# Patient Record
Sex: FEMALE | Employment: PART TIME | URBAN - METROPOLITAN AREA
[De-identification: names, ages, dates, MRNs, and addresses within clinical notes are randomized per-mention and may not be internally consistent; named-entity substitution may affect disease eponyms.]

---

## 2022-11-07 ENCOUNTER — HOSPITAL ENCOUNTER (OUTPATIENT)
Dept: MAMMOGRAPHY | Facility: HOSPITAL | Age: 42
Discharge: HOME | End: 2022-11-07
Attending: SPECIALIST
Payer: COMMERCIAL

## 2022-11-07 DIAGNOSIS — Z12.31 SCREENING MAMMOGRAM, ENCOUNTER FOR: ICD-10-CM

## 2022-11-07 PROCEDURE — 77067 SCR MAMMO BI INCL CAD: CPT | Performed by: SPECIALIST

## 2022-11-07 PROCEDURE — 77063 BREAST TOMOSYNTHESIS BI: CPT | Performed by: SPECIALIST

## 2023-04-10 ENCOUNTER — OFFICE VISIT (OUTPATIENT)
Dept: INTERNAL MEDICINE CLINIC | Facility: CLINIC | Age: 43
End: 2023-04-10
Payer: COMMERCIAL

## 2023-04-10 VITALS
WEIGHT: 183.38 LBS | OXYGEN SATURATION: 96 % | DIASTOLIC BLOOD PRESSURE: 76 MMHG | TEMPERATURE: 98 F | BODY MASS INDEX: 28.45 KG/M2 | HEART RATE: 69 BPM | SYSTOLIC BLOOD PRESSURE: 120 MMHG | RESPIRATION RATE: 14 BRPM | HEIGHT: 67.44 IN

## 2023-04-10 DIAGNOSIS — Z86.2 HISTORY OF IRON DEFICIENCY ANEMIA: ICD-10-CM

## 2023-04-10 DIAGNOSIS — Z13.1 SCREENING FOR DIABETES MELLITUS: ICD-10-CM

## 2023-04-10 DIAGNOSIS — Z00.00 ENCOUNTER FOR ANNUAL PHYSICAL EXAM: Primary | ICD-10-CM

## 2023-04-10 DIAGNOSIS — E27.8 ADRENAL CYST (HCC): ICD-10-CM

## 2023-04-10 DIAGNOSIS — K58.1 IRRITABLE BOWEL SYNDROME WITH CONSTIPATION: ICD-10-CM

## 2023-04-10 DIAGNOSIS — E03.9 ACQUIRED HYPOTHYROIDISM: ICD-10-CM

## 2023-04-10 DIAGNOSIS — Z13.220 SCREENING FOR LIPID DISORDERS: ICD-10-CM

## 2023-04-10 PROBLEM — K58.9 IBS (IRRITABLE BOWEL SYNDROME): Status: ACTIVE | Noted: 2023-04-10

## 2023-04-10 PROCEDURE — 3078F DIAST BP <80 MM HG: CPT | Performed by: NURSE PRACTITIONER

## 2023-04-10 PROCEDURE — 3008F BODY MASS INDEX DOCD: CPT | Performed by: NURSE PRACTITIONER

## 2023-04-10 PROCEDURE — 3074F SYST BP LT 130 MM HG: CPT | Performed by: NURSE PRACTITIONER

## 2023-04-10 PROCEDURE — 99386 PREV VISIT NEW AGE 40-64: CPT | Performed by: NURSE PRACTITIONER

## 2023-04-10 RX ORDER — LEVOTHYROXINE SODIUM 88 UG/1
88 TABLET ORAL DAILY
COMMUNITY
Start: 2023-02-20

## 2023-04-10 RX ORDER — MELATONIN
1000 DAILY
COMMUNITY

## 2023-04-10 NOTE — PATIENT INSTRUCTIONS
Get your labs done. You should be fasting for at least 10 hours. If you take a multivitamin with Biotin or any biotin product it should be held for 3 days prior to getting your labs done. If you use [facility] labs, you may schedule at [phone] or on-line at [facility].     See GI. See gyne as planned. Get your covid booster at the local pharmacy.

## 2023-04-17 ENCOUNTER — LAB ENCOUNTER (OUTPATIENT)
Dept: LAB | Age: 43
End: 2023-04-17
Attending: NURSE PRACTITIONER
Payer: COMMERCIAL

## 2023-04-17 LAB
ALBUMIN SERPL-MCNC: 3.7 G/DL (ref 3.4–5)
ALBUMIN/GLOB SERPL: 1.1 {RATIO} (ref 1–2)
ALP LIVER SERPL-CCNC: 42 U/L
ALT SERPL-CCNC: 26 U/L
ANION GAP SERPL CALC-SCNC: 0 MMOL/L (ref 0–18)
AST SERPL-CCNC: 19 U/L (ref 15–37)
BASOPHILS # BLD AUTO: 0.04 X10(3) UL (ref 0–0.2)
BASOPHILS NFR BLD AUTO: 0.6 %
BILIRUB SERPL-MCNC: 0.6 MG/DL (ref 0.1–2)
BUN BLD-MCNC: 11 MG/DL (ref 7–18)
CALCIUM BLD-MCNC: 8.9 MG/DL (ref 8.5–10.1)
CHLORIDE SERPL-SCNC: 108 MMOL/L (ref 98–112)
CHOLEST SERPL-MCNC: 174 MG/DL (ref ?–200)
CO2 SERPL-SCNC: 26 MMOL/L (ref 21–32)
CREAT BLD-MCNC: 0.94 MG/DL
DEPRECATED HBV CORE AB SER IA-ACNC: 11.3 NG/ML
EOSINOPHIL # BLD AUTO: 0.31 X10(3) UL (ref 0–0.7)
EOSINOPHIL NFR BLD AUTO: 4.6 %
ERYTHROCYTE [DISTWIDTH] IN BLOOD BY AUTOMATED COUNT: 11.9 %
EST. AVERAGE GLUCOSE BLD GHB EST-MCNC: 103 MG/DL (ref 68–126)
FASTING PATIENT LIPID ANSWER: YES
FASTING STATUS PATIENT QL REPORTED: YES
GFR SERPLBLD BASED ON 1.73 SQ M-ARVRAT: 77 ML/MIN/1.73M2 (ref 60–?)
GLOBULIN PLAS-MCNC: 3.3 G/DL (ref 2.8–4.4)
GLUCOSE BLD-MCNC: 101 MG/DL (ref 70–99)
HBA1C MFR BLD: 5.2 % (ref ?–5.7)
HCT VFR BLD AUTO: 42.4 %
HDLC SERPL-MCNC: 85 MG/DL (ref 40–59)
HGB BLD-MCNC: 14.1 G/DL
IMM GRANULOCYTES # BLD AUTO: 0.02 X10(3) UL (ref 0–1)
IMM GRANULOCYTES NFR BLD: 0.3 %
IRON SATN MFR SERPL: 33 %
IRON SERPL-MCNC: 158 UG/DL
LDLC SERPL CALC-MCNC: 71 MG/DL (ref ?–100)
LYMPHOCYTES # BLD AUTO: 2.36 X10(3) UL (ref 1–4)
LYMPHOCYTES NFR BLD AUTO: 35.3 %
MCH RBC QN AUTO: 32.1 PG (ref 26–34)
MCHC RBC AUTO-ENTMCNC: 33.3 G/DL (ref 31–37)
MCV RBC AUTO: 96.6 FL
MONOCYTES # BLD AUTO: 0.67 X10(3) UL (ref 0.1–1)
MONOCYTES NFR BLD AUTO: 10 %
NEUTROPHILS # BLD AUTO: 3.29 X10 (3) UL (ref 1.5–7.7)
NEUTROPHILS # BLD AUTO: 3.29 X10(3) UL (ref 1.5–7.7)
NEUTROPHILS NFR BLD AUTO: 49.2 %
NONHDLC SERPL-MCNC: 89 MG/DL (ref ?–130)
OSMOLALITY SERPL CALC.SUM OF ELEC: 278 MOSM/KG (ref 275–295)
PLATELET # BLD AUTO: 239 10(3)UL (ref 150–450)
POTASSIUM SERPL-SCNC: 4.3 MMOL/L (ref 3.5–5.1)
PROT SERPL-MCNC: 7 G/DL (ref 6.4–8.2)
RBC # BLD AUTO: 4.39 X10(6)UL
SODIUM SERPL-SCNC: 134 MMOL/L (ref 136–145)
TIBC SERPL-MCNC: 483 UG/DL (ref 240–450)
TRANSFERRIN SERPL-MCNC: 324 MG/DL (ref 200–360)
TRIGL SERPL-MCNC: 105 MG/DL (ref 30–149)
TSI SER-ACNC: 0.7 MIU/ML (ref 0.36–3.74)
VLDLC SERPL CALC-MCNC: 16 MG/DL (ref 0–30)
WBC # BLD AUTO: 6.7 X10(3) UL (ref 4–11)

## 2023-04-17 PROCEDURE — 83550 IRON BINDING TEST: CPT | Performed by: NURSE PRACTITIONER

## 2023-04-17 PROCEDURE — 83540 ASSAY OF IRON: CPT | Performed by: NURSE PRACTITIONER

## 2023-04-17 PROCEDURE — 83036 HEMOGLOBIN GLYCOSYLATED A1C: CPT | Performed by: NURSE PRACTITIONER

## 2023-04-17 PROCEDURE — 80061 LIPID PANEL: CPT | Performed by: NURSE PRACTITIONER

## 2023-04-17 PROCEDURE — 82728 ASSAY OF FERRITIN: CPT | Performed by: NURSE PRACTITIONER

## 2023-04-17 PROCEDURE — 80050 GENERAL HEALTH PANEL: CPT | Performed by: NURSE PRACTITIONER

## 2023-05-08 ENCOUNTER — OFFICE VISIT (OUTPATIENT)
Facility: CLINIC | Age: 43
End: 2023-05-08
Payer: COMMERCIAL

## 2023-05-08 VITALS
BODY MASS INDEX: 28.91 KG/M2 | WEIGHT: 184.19 LBS | DIASTOLIC BLOOD PRESSURE: 76 MMHG | SYSTOLIC BLOOD PRESSURE: 116 MMHG | HEART RATE: 69 BPM | HEIGHT: 67 IN

## 2023-05-08 DIAGNOSIS — E66.3 OVERWEIGHT (BMI 25.0-29.9): ICD-10-CM

## 2023-05-08 DIAGNOSIS — R19.8 IRREGULAR BOWEL HABITS: ICD-10-CM

## 2023-05-08 DIAGNOSIS — Z01.411 ENCOUNTER FOR WELL WOMAN EXAM WITH ABNORMAL FINDINGS: Primary | ICD-10-CM

## 2023-05-08 DIAGNOSIS — N94.6 DYSMENORRHEA: ICD-10-CM

## 2023-05-08 DIAGNOSIS — D21.9 FIBROID: ICD-10-CM

## 2023-05-08 DIAGNOSIS — R45.86 MOOD DISTURBANCE: ICD-10-CM

## 2023-05-08 DIAGNOSIS — R73.01 ELEVATED FASTING GLUCOSE: ICD-10-CM

## 2023-05-08 DIAGNOSIS — R92.2 DENSE BREASTS: ICD-10-CM

## 2023-05-08 DIAGNOSIS — Z12.39 SCREENING FOR BREAST CANCER USING NON-MAMMOGRAM MODALITY: ICD-10-CM

## 2023-05-08 DIAGNOSIS — Z30.09 GENERAL COUNSELING AND ADVICE FOR CONTRACEPTIVE MANAGEMENT: ICD-10-CM

## 2023-05-08 DIAGNOSIS — N94.10 DYSPAREUNIA, FEMALE: ICD-10-CM

## 2023-05-08 DIAGNOSIS — Z12.4 SCREENING FOR CERVICAL CANCER: ICD-10-CM

## 2023-05-08 DIAGNOSIS — Z12.31 ENCOUNTER FOR SCREENING MAMMOGRAM FOR MALIGNANT NEOPLASM OF BREAST: ICD-10-CM

## 2023-05-08 DIAGNOSIS — N92.0 MENORRHAGIA WITH REGULAR CYCLE: ICD-10-CM

## 2023-05-08 PROCEDURE — 3008F BODY MASS INDEX DOCD: CPT | Performed by: OBSTETRICS & GYNECOLOGY

## 2023-05-08 PROCEDURE — 87624 HPV HI-RISK TYP POOLED RSLT: CPT | Performed by: OBSTETRICS & GYNECOLOGY

## 2023-05-08 PROCEDURE — 99204 OFFICE O/P NEW MOD 45 MIN: CPT | Performed by: OBSTETRICS & GYNECOLOGY

## 2023-05-08 PROCEDURE — 99386 PREV VISIT NEW AGE 40-64: CPT | Performed by: OBSTETRICS & GYNECOLOGY

## 2023-05-08 PROCEDURE — 3078F DIAST BP <80 MM HG: CPT | Performed by: OBSTETRICS & GYNECOLOGY

## 2023-05-08 PROCEDURE — 3074F SYST BP LT 130 MM HG: CPT | Performed by: OBSTETRICS & GYNECOLOGY

## 2023-05-08 NOTE — PATIENT INSTRUCTIONS
Pelvic ultrasound best done cycle day 5-9 (tail end of period)    THE [facility] Pelvic Floor Physical Therapy    [address], [address] [facility] [place] [address], [address] [facility]. Ph: [phone]  [address], [facility], 2nd floor. [facility]. Ph: [phone] & [phone]  [address], [place], [address] [facility]. Ph [phone]  [address], [place], [place] [address]. Ph [phone]  [address], [facility]. Ph [phone]  [address] [place] [address]. Ph [phone]     The Role of Physical Therapy in the Treatment of Pelvic Floor Dysfunction:    Physical therapists are trained to evaluate and treat dysfunctions in the joints, muscles, nerves and scar. Physical therapists specifically trained in the area of pelvic health can identify the possible musculoskeletal causes of pelvic pain, bladder and bowel difficulties and develop a treatment plan specific to the individual suffering from this difficulties. What to expect at your first physical therapy appointment:   Your first visit will include an initial evaluation in a comfortable, private room by a therapist who has undergone advanced education and training in the evaluation and treatment of pelvic muscle dysfunction. The therapist will obtain a detailed history of your health, pain and activity limitations. She will also ask you about any bowel, bladder and sexual difficulties as these are in part controlled by the pelvic muscles. The therapist will then take a look at your posture, mobility of your spine and hips and strength and flexibility of pelvic girdle muscles. She will examine any scar tissue and trigger points in the muscles of your pelvic region. The therapist will also specifically examine the pelvic floor muscles. Your pelvic floor consists of a group of muscles that attach behind the pubic bone in the front to the tail bone in the back.  They are responsible for providing support to the pelvic joints and organs, relaxing to allow the passage of urine, stool and gas and cayden to prevent the loss of urine, stool and gas as appropriate. In order to best examine these muscles you will be asked to undress from the waist down and be covered with a sheet. The therapist will use a lubricated, gloved finger to identify painful muscles around and in your vagina or rectum then instruct you to contract and relax these muscles in order to determine how the muscles are functioning. Care is taken to make you as comfortable as possible with the exam.     Your therapist will discuss the evaluation results with you and provide you with education regarding your specific condition and the expectation of therapy. She will answer all of your questions and will work with you to establish a treatment plan based on the results of the evaluation and your goals for therapy. Breast Density:  Because of breast density - may benefit from supplemental screening with Molecular Breast Imaging (MBI) or Bilateral Whole Breast Screening Ultrasound for increased sensitivity for detection of cancer which can be obscured mammographically. Insurance does not always cover these additional methods of screening. If you are interested in having either of these done, we ask that you contact your insurance to verify coverage and let us know if you want  either of them ordered.      The codes to provide your insurance with are as follows:      Bilateral Whole Breast Ultrasound  Procedure code: 219008-75  Diagnosis codes: Dense breast (R92.2), Screening for breast cancer other than mammogram (Z12.39)     Molecular Breast Imaging  Procedure code: 18796  Diagnosis codes: Dense breast (R92.2), Screening for breast cancer other than mammogram (Z12.39)

## 2023-05-09 LAB — HPV I/H RISK 1 DNA SPEC QL NAA+PROBE: NEGATIVE

## 2023-05-22 ENCOUNTER — HOSPITAL ENCOUNTER (OUTPATIENT)
Dept: ULTRASOUND IMAGING | Age: 43
Discharge: HOME | End: 2023-05-22
Attending: OBSTETRICS & GYNECOLOGY
Payer: COMMERCIAL

## 2023-05-22 DIAGNOSIS — D21.9 FIBROID: ICD-10-CM

## 2023-05-22 DIAGNOSIS — N94.10 DYSPAREUNIA, FEMALE: ICD-10-CM

## 2023-05-22 DIAGNOSIS — N92.0 MENORRHAGIA WITH REGULAR CYCLE: ICD-10-CM

## 2023-05-22 PROCEDURE — 76830 TRANSVAGINAL US NON-OB: CPT | Performed by: OBSTETRICS & GYNECOLOGY

## 2023-05-22 PROCEDURE — 76856 US EXAM PELVIC COMPLETE: CPT | Performed by: OBSTETRICS & GYNECOLOGY

## 2023-05-23 ENCOUNTER — TELEPHONE (OUTPATIENT)
Facility: CLINIC | Age: 43
End: 2023-05-23

## 2023-05-23 NOTE — PROGRESS NOTES
Pt aware of results & recommendations for an EMB to start. Scheduled 7/17/23 at 2:00. Breifly discussed other options if EMB is negative: hormonal contraceptives, GnRH antagonist, myomectomy, hysterectomy, expectant management. Will discuss further at upcoming appointment. Verbalized understanding.

## 2023-06-12 ENCOUNTER — TELEPHONE (OUTPATIENT)
Dept: ENDOCRINOLOGY CLINIC | Facility: CLINIC | Age: 43
End: 2023-06-12

## 2023-06-12 ENCOUNTER — LAB ENCOUNTER (OUTPATIENT)
Dept: LAB | Age: 43
End: 2023-06-12
Attending: NURSE PRACTITIONER
Payer: COMMERCIAL

## 2023-06-12 DIAGNOSIS — E87.1 HYPONATREMIA: ICD-10-CM

## 2023-06-12 DIAGNOSIS — R14.0 BLOATING: ICD-10-CM

## 2023-06-12 LAB
IGA SERPL-MCNC: 212 MG/DL (ref 70–312)
SODIUM SERPL-SCNC: 136 MMOL/L (ref 136–145)

## 2023-06-12 PROCEDURE — 84295 ASSAY OF SERUM SODIUM: CPT

## 2023-06-12 PROCEDURE — 82784 ASSAY IGA/IGD/IGG/IGM EACH: CPT

## 2023-06-12 PROCEDURE — 86364 TISS TRNSGLTMNASE EA IG CLAS: CPT

## 2023-06-12 PROCEDURE — 36415 COLL VENOUS BLD VENIPUNCTURE: CPT

## 2023-06-12 NOTE — TELEPHONE ENCOUNTER
Patient was scheduled for a consult with Dr. Som goldman for noris-menopause symptoms. Has new insurance that does not verify as active and declined self-pay. Rescheduled for 10/09 with a waitlist. Patient requesting sooner appointment. Please follow-up with patient.

## 2023-06-13 LAB — TTG IGA SER-ACNC: 0.4 U/ML (ref ?–7)

## 2023-06-26 NOTE — TELEPHONE ENCOUNTER
Can get on wait list please   Can also offer apt with another MD if available sooner  Can not add on at this time, since we are very booked  Thanks

## 2023-07-17 ENCOUNTER — OFFICE VISIT (OUTPATIENT)
Facility: CLINIC | Age: 43
End: 2023-07-17
Payer: COMMERCIAL

## 2023-07-17 VITALS
HEIGHT: 63 IN | BODY MASS INDEX: 32.43 KG/M2 | WEIGHT: 183 LBS | SYSTOLIC BLOOD PRESSURE: 120 MMHG | HEART RATE: 78 BPM | DIASTOLIC BLOOD PRESSURE: 78 MMHG

## 2023-07-17 DIAGNOSIS — Z01.812 PRE-PROCEDURAL LABORATORY EXAMINATION: ICD-10-CM

## 2023-07-17 DIAGNOSIS — Z71.2 ENCOUNTER TO DISCUSS TEST RESULTS: Primary | ICD-10-CM

## 2023-07-17 DIAGNOSIS — D21.9 FIBROID: ICD-10-CM

## 2023-07-17 DIAGNOSIS — N94.6 DYSMENORRHEA: ICD-10-CM

## 2023-07-17 DIAGNOSIS — N94.10 DYSPAREUNIA, FEMALE: ICD-10-CM

## 2023-07-17 DIAGNOSIS — N92.0 MENORRHAGIA WITH REGULAR CYCLE: ICD-10-CM

## 2023-07-17 LAB
CONTROL LINE PRESENT WITH A CLEAR BACKGROUND (YES/NO): YES YES/NO
KIT LOT #: NORMAL NUMERIC
PREGNANCY TEST, URINE: NEGATIVE

## 2023-07-17 PROCEDURE — 88305 TISSUE EXAM BY PATHOLOGIST: CPT | Performed by: OBSTETRICS & GYNECOLOGY

## 2023-07-17 NOTE — PATIENT INSTRUCTIONS
Central Scheduling Phone: [phone]  Suresh Merritt Pelvic Floor Physical Therapy  [address], [address] [facility] [place], [address] Ph: [phone]  [address] [facility], [facility], 2nd floor. [facility]. Ph: [phone] & [phone]  [address], [place], [address] [facility]. Ph [phone]  [address], [facility] 72. Ph [phone]  [address] [place] [address], [facility]. Ph [phone] [address] [place] [address].  Ph [phone]

## 2023-07-17 NOTE — PROCEDURES
2023    Procedure: Endometrial biopsy    Pre-op Dx: Menorrhagia, Dysmenorrhea, Fibroid    Post-op Dx: Same    Indication: 39year old  female with history of 7 cm fibroid since at least . Worsening menorrhagia, dysmenorrhea, longstanding dyspareunia, occasional intermenstrual cramping, irregular bowel habits with blood in stool & dyschezia, known fibroid uterus. Pelvic US 23 with 2.5 mm endometrium, fibroid 7.1 cm intramural posterior fundus, some of which appeared degenerate & did appear to distort the endometrial cavity somewhat. Ovaries normal. There was also a small isthmocele noted on my read of images as well. Recommend endometrial biopsy to start. Then treatment options will depend on pathology. Risks, benefits, alternatives discussed. Pelvic floor moderate to severe hypertonicity  Speculum placed. Cervix very anterior  Difficult to reach anterior lip of cervix with speculum  Cervix swabbed with betadine x 3   Tenaculum applied to posterior lip of cervix  Cervical canal not stenotic  Pipelle inserted without difficulty to 9 cm  Multiple passes made with moderate tissue obtained. Pipelle & tenaculum removed  Hemostatic tenaculum sites  Speculum removed.    Tolerated well  EBL minimal  Specimen: endometrial biopsy    Pap & HPV neg 23    Radha Lopez MD

## 2023-07-21 PROBLEM — N84.0 ENDOMETRIAL POLYP: Status: ACTIVE | Noted: 2023-07-17

## 2023-07-24 ENCOUNTER — TELEPHONE (OUTPATIENT)
Facility: CLINIC | Age: 43
End: 2023-07-24

## 2023-08-09 ENCOUNTER — TELEPHONE (OUTPATIENT)
Facility: CLINIC | Age: 43
End: 2023-08-09

## 2023-08-10 NOTE — TELEPHONE ENCOUNTER
Poke with pt. Discussed options for surgery. Pt does not want an IUD and will hold off on the ablation. Will schedule a hysteroscopy D&C Polypectomy. Pt aware Glendon Goltz will call once scheduled. Prefers Monday's if possible. Verbalized understanding.

## 2023-08-11 ENCOUNTER — TELEPHONE (OUTPATIENT)
Facility: CLINIC | Age: 43
End: 2023-08-11

## 2023-08-11 DIAGNOSIS — N84.0 ENDOMETRIAL POLYP: ICD-10-CM

## 2023-08-11 DIAGNOSIS — N92.0 MENORRHAGIA WITH REGULAR CYCLE: Primary | ICD-10-CM

## 2023-08-11 DIAGNOSIS — N94.6 DYSMENORRHEA: ICD-10-CM

## 2023-09-05 PROBLEM — K21.9 GASTROESOPHAGEAL REFLUX DISEASE: Status: ACTIVE | Noted: 2023-09-05

## 2023-09-05 PROBLEM — R14.1 ABDOMINAL GAS PAIN: Status: ACTIVE | Noted: 2023-09-05

## 2023-09-05 PROBLEM — R19.7 DIARRHEA, UNSPECIFIED: Status: ACTIVE | Noted: 2023-09-05

## 2023-09-05 PROBLEM — D50.9 ANEMIA, IRON DEFICIENCY: Status: ACTIVE | Noted: 2023-09-05

## 2023-09-18 ENCOUNTER — LABORATORY ENCOUNTER (OUTPATIENT)
Dept: LAB | Age: 43
End: 2023-09-18
Attending: OBSTETRICS & GYNECOLOGY
Payer: COMMERCIAL

## 2023-09-18 DIAGNOSIS — Z01.818 PRE-OP TESTING: ICD-10-CM

## 2023-09-18 LAB
ERYTHROCYTE [DISTWIDTH] IN BLOOD BY AUTOMATED COUNT: 11.7 %
HCT VFR BLD AUTO: 43.1 %
HGB BLD-MCNC: 14 G/DL
MCH RBC QN AUTO: 31.7 PG (ref 26–34)
MCHC RBC AUTO-ENTMCNC: 32.5 G/DL (ref 31–37)
MCV RBC AUTO: 97.5 FL
PLATELET # BLD AUTO: 243 10(3)UL (ref 150–450)
RBC # BLD AUTO: 4.42 X10(6)UL
WBC # BLD AUTO: 7.5 X10(3) UL (ref 4–11)

## 2023-09-18 PROCEDURE — 85027 COMPLETE CBC AUTOMATED: CPT

## 2023-09-18 PROCEDURE — 36415 COLL VENOUS BLD VENIPUNCTURE: CPT

## 2023-09-21 ENCOUNTER — HOSPITAL ENCOUNTER (OUTPATIENT)
Facility: HOSPITAL | Age: 43
Setting detail: HOSPITAL OUTPATIENT SURGERY
Discharge: HOME | End: 2023-09-21
Attending: OBSTETRICS & GYNECOLOGY | Admitting: OBSTETRICS & GYNECOLOGY
Payer: COMMERCIAL

## 2023-09-21 ENCOUNTER — ANESTHESIA (OUTPATIENT)
Dept: SURGERY | Facility: HOSPITAL | Age: 43
End: 2023-09-21
Payer: COMMERCIAL

## 2023-09-21 ENCOUNTER — ANESTHESIA EVENT (OUTPATIENT)
Dept: SURGERY | Facility: HOSPITAL | Age: 43
End: 2023-09-21
Payer: COMMERCIAL

## 2023-09-21 VITALS
HEIGHT: 67 IN | WEIGHT: 178.56 LBS | HEART RATE: 59 BPM | SYSTOLIC BLOOD PRESSURE: 108 MMHG | RESPIRATION RATE: 16 BRPM | TEMPERATURE: 97 F | BODY MASS INDEX: 28.02 KG/M2 | OXYGEN SATURATION: 100 % | DIASTOLIC BLOOD PRESSURE: 70 MMHG

## 2023-09-21 DIAGNOSIS — N92.0 MENORRHAGIA WITH REGULAR CYCLE: ICD-10-CM

## 2023-09-21 DIAGNOSIS — N84.0 ENDOMETRIAL POLYP: ICD-10-CM

## 2023-09-21 DIAGNOSIS — Z01.818 PRE-OP TESTING: Primary | ICD-10-CM

## 2023-09-21 DIAGNOSIS — N94.6 DYSMENORRHEA: ICD-10-CM

## 2023-09-21 PROBLEM — Z98.890 STATUS POST HYSTEROSCOPY: Status: ACTIVE | Noted: 2023-09-21

## 2023-09-21 PROBLEM — Z98.890 STATUS POST DILATION AND CURETTAGE: Status: ACTIVE | Noted: 2023-09-21

## 2023-09-21 PROCEDURE — 0UB98ZZ EXCISION OF UTERUS, VIA NATURAL OR ARTIFICIAL OPENING ENDOSCOPIC: ICD-10-PCS | Performed by: OBSTETRICS & GYNECOLOGY

## 2023-09-21 PROCEDURE — 58558 HYSTEROSCOPY BIOPSY: CPT | Performed by: OBSTETRICS & GYNECOLOGY

## 2023-09-21 RX ORDER — LIDOCAINE HYDROCHLORIDE AND EPINEPHRINE 10; 10 MG/ML; UG/ML
INJECTION, SOLUTION INFILTRATION; PERINEURAL AS NEEDED
Status: DISCONTINUED | OUTPATIENT
Start: 2023-09-21 | End: 2023-09-21 | Stop reason: HOSPADM

## 2023-09-21 RX ORDER — HYDROMORPHONE HYDROCHLORIDE 1 MG/ML
0.6 INJECTION, SOLUTION INTRAMUSCULAR; INTRAVENOUS; SUBCUTANEOUS EVERY 5 MIN PRN
Status: DISCONTINUED | OUTPATIENT
Start: 2023-09-21 | End: 2023-09-21

## 2023-09-21 RX ORDER — LIDOCAINE HYDROCHLORIDE 10 MG/ML
INJECTION, SOLUTION EPIDURAL; INFILTRATION; INTRACAUDAL; PERINEURAL AS NEEDED
Status: DISCONTINUED | OUTPATIENT
Start: 2023-09-21 | End: 2023-09-21 | Stop reason: SURG

## 2023-09-21 RX ORDER — SODIUM CHLORIDE, SODIUM LACTATE, POTASSIUM CHLORIDE, CALCIUM CHLORIDE 600; 310; 30; 20 MG/100ML; MG/100ML; MG/100ML; MG/100ML
INJECTION, SOLUTION INTRAVENOUS CONTINUOUS
Status: DISCONTINUED | OUTPATIENT
Start: 2023-09-21 | End: 2023-09-21

## 2023-09-21 RX ORDER — ACETAMINOPHEN 500 MG
1000 TABLET ORAL ONCE
Status: DISCONTINUED | OUTPATIENT
Start: 2023-09-21 | End: 2023-09-21 | Stop reason: HOSPADM

## 2023-09-21 RX ORDER — ACETAMINOPHEN 500 MG
1000 TABLET ORAL ONCE AS NEEDED
Status: DISCONTINUED | OUTPATIENT
Start: 2023-09-21 | End: 2023-09-21

## 2023-09-21 RX ORDER — HYDROCODONE BITARTRATE AND ACETAMINOPHEN 5; 325 MG/1; MG/1
1 TABLET ORAL ONCE AS NEEDED
Status: DISCONTINUED | OUTPATIENT
Start: 2023-09-21 | End: 2023-09-21

## 2023-09-21 RX ORDER — KETOROLAC TROMETHAMINE 30 MG/ML
INJECTION, SOLUTION INTRAMUSCULAR; INTRAVENOUS AS NEEDED
Status: DISCONTINUED | OUTPATIENT
Start: 2023-09-21 | End: 2023-09-21 | Stop reason: SURG

## 2023-09-21 RX ORDER — HYDROMORPHONE HYDROCHLORIDE 1 MG/ML
0.2 INJECTION, SOLUTION INTRAMUSCULAR; INTRAVENOUS; SUBCUTANEOUS EVERY 5 MIN PRN
Status: DISCONTINUED | OUTPATIENT
Start: 2023-09-21 | End: 2023-09-21

## 2023-09-21 RX ORDER — ONDANSETRON 2 MG/ML
INJECTION INTRAMUSCULAR; INTRAVENOUS AS NEEDED
Status: DISCONTINUED | OUTPATIENT
Start: 2023-09-21 | End: 2023-09-21 | Stop reason: SURG

## 2023-09-21 RX ORDER — HYDROCODONE BITARTRATE AND ACETAMINOPHEN 5; 325 MG/1; MG/1
2 TABLET ORAL ONCE AS NEEDED
Status: DISCONTINUED | OUTPATIENT
Start: 2023-09-21 | End: 2023-09-21

## 2023-09-21 RX ORDER — NALOXONE HYDROCHLORIDE 0.4 MG/ML
0.08 INJECTION, SOLUTION INTRAMUSCULAR; INTRAVENOUS; SUBCUTANEOUS AS NEEDED
Status: DISCONTINUED | OUTPATIENT
Start: 2023-09-21 | End: 2023-09-21

## 2023-09-21 RX ORDER — HYDROMORPHONE HYDROCHLORIDE 1 MG/ML
0.4 INJECTION, SOLUTION INTRAMUSCULAR; INTRAVENOUS; SUBCUTANEOUS EVERY 5 MIN PRN
Status: DISCONTINUED | OUTPATIENT
Start: 2023-09-21 | End: 2023-09-21

## 2023-09-21 RX ORDER — SCOLOPAMINE TRANSDERMAL SYSTEM 1 MG/1
1 PATCH, EXTENDED RELEASE TRANSDERMAL ONCE
Status: DISCONTINUED | OUTPATIENT
Start: 2023-09-21 | End: 2023-09-21 | Stop reason: HOSPADM

## 2023-09-21 RX ADMIN — ONDANSETRON 4 MG: 2 INJECTION INTRAMUSCULAR; INTRAVENOUS at 16:11:00

## 2023-09-21 RX ADMIN — SODIUM CHLORIDE, SODIUM LACTATE, POTASSIUM CHLORIDE, CALCIUM CHLORIDE: 600; 310; 30; 20 INJECTION, SOLUTION INTRAVENOUS at 15:40:00

## 2023-09-21 RX ADMIN — KETOROLAC TROMETHAMINE 30 MG: 30 INJECTION, SOLUTION INTRAMUSCULAR; INTRAVENOUS at 16:11:00

## 2023-09-21 RX ADMIN — LIDOCAINE HYDROCHLORIDE 50 MG: 10 INJECTION, SOLUTION EPIDURAL; INFILTRATION; INTRACAUDAL; PERINEURAL at 15:47:00

## 2023-09-21 NOTE — INTERVAL H&P NOTE
Pre-op Diagnosis: Menorrhagia with regular cycle [N92.0]  Dysmenorrhea [N94.6]  Endometrial polyp [N84.0]    The above referenced H&P was reviewed by Patricio Cheung MD on 9/21/2023, the patient was examined and no significant changes have occurred in the patient's condition since the H&P was performed. Just finishing up period - is day 5 today. Feels periods have not been too bad lately. I discussed with the patient and/or legal representative the potential benefits, risks and side effects of this procedure; the likelihood of the patient achieving goals; and potential problems that might occur during recuperation. I discussed reasonable alternatives to the procedure, including risks, benefits and side effects related to the alternatives and risks related to not receiving this procedure. We will proceed with procedure as planned.

## 2023-09-21 NOTE — ANESTHESIA POSTPROCEDURE EVALUATION
[address] Patient Status:  Hospital Outpatient Surgery   Age/Gender 39year old female MRN [id number]   [facility] SURGERY Attending Chris Austin MD   Hosp Day # 0 PCP Joseph Arita MD       Anesthesia Post-op Note    Truclear HYSTEROSCOPY DILATION AND CURETTAGE, Polypectomy    Procedure Summary       Date: 09/21/23 Room / Location: [address] [place] [address] OR 17 / [facility] OR    Anesthesia Start: 6954 Anesthesia Stop: 3792    Procedure: Truclear HYSTEROSCOPY DILATION AND CURETTAGE, Polypectomy Diagnosis:       Menorrhagia with regular cycle      Dysmenorrhea      Endometrial polyp      (Menorrhagia with regular cycle [H59. 0]Dysmenorrhea B1963034. 6]Endometrial polyp [N84.0])    Surgeons: Chris Austin MD Anesthesiologist: Ritika Lemons MD    Anesthesia Type: MAC ASA Status: 2            Anesthesia Type: MAC    Vitals Value Taken Time   /63 09/21/23 1621   Temp 97.3 09/21/23 1621   Pulse 68 09/21/23 1621   Resp 18 09/21/23 1621   SpO2 100 09/21/23 1621       Patient Location: Same Day Surgery    Anesthesia Type: MAC    Airway Patency: patent, extubated and oral/nasal airway    Postop Pain Control: adequate    Nausea/Vomiting: none    Cardiopulmonary/Hydration status: stable euvolemic    Complications: no apparent anesthesia related complications    Postop vital signs: stable    Dental Exam: Unchanged from Preop    Patient to be discharged from PACU when criteria met.

## 2023-09-21 NOTE — DISCHARGE INSTRUCTIONS
Nothing in the vagina for 2 weeks. No strenuous activity/exercise for 48 hours (it can increase bleeding from the uterus.)   Please still take frequent walks. Stairs are fine. No tub baths for 2 weeks. May shower.     Please call office if:  -fever 100.4 or higher    Please proceed to the Emergency Department at [facility] for any of the following:   -vaginal bleeding soaking greater than 1 pad per hour  -severe pelvic pain  -shortness of breath  -chest pain  -leg pain or swelling     You received a drug called Toradol which is an Anti Inflammatory at: 4:11PM  If you are allowed to take Anti inflammatories:    Do not take any Anti Inflammatory like Motrin, Aleve or Ibuprophen until after: 10:11PM  Please report any suspected allergic reactions or bleeding issues to your doctor

## 2023-09-21 NOTE — OPERATIVE REPORT
Operative Report:     Reddy Schwab  : 1980     Date of procedure: 23    INDICATIONS:   39year old  female with known 7.1 cm dominant fibroid, worsening menorrhagia, dysmenorrhea. Longstanding dyspareunia, irregular bowel habits. Intermenstrual cramping, whose recent pelvic ultrasound appeared to show some cystic degeneration of the fibroid still about 7.1 cm in size (stable from years prior.) Endometrial biopsy with apparent necrotic endometrial polyp. Presents today for hysteroscopic polypectomy, D&C. PRE-OP DIAGNOSIS:   Menorrhagia  Endometrial polyp       POST-OP DIAGNOSIS:   Menorrhagia  Possible endometrial polyp   Isthmocele    PROCEDURE(S):   Hysteroscopy, Dilation and curettage of uterus using Truclear hysteroscopic morcellator     ANESTHESIA: MAC with paracervical block     SURGEON(S): Leticia Veliz MD     ESTIMATED BLOOD LOSS: 5 mL           DRAINS: None            UTERINE DISTENSION MEDIUM: Normal Saline 0.9%  DEFICIT: 825 mL     SPECIMENS: Endometrial curettings (with inclusion of some sampling of the isthmocele cavity)             COMPLICATIONS:  None apparent     FINDINGS: Normal external genitalia, no lesions. Cervix very anterior, difficult to visualize with speculum alone. Tenaculum needed to be used to grasp and reorient the cervix for visualization. Retroverted enlarged uterus about 8 wk size. Uterus sounded to 7-8 cm. Endometrial cavity arcuate vs slightly bicornuate in shape though patient with known large posterior fundal fibroid, which could be distorting the cavity shape. Endometrium relatively thin overall. Patient is on tail end of period. There was an approximately 1 cm slightly raised patch of erythematous and somewhat edematous appearing tissue on the anterior mid portion of the uterine wall, which could have been a subtle polyp. Bilateral tubal ostia seen.  Definite isthmocele ( scar niche) noted with slightly fluffy appearing tissue within. PROCEDURE: This procedure was fully reviewed with the patient and written informed consent was obtained after discussing risks, benefits, indication and alternatives. All questions were answered. The patient was taken to operating room. SCDs were placed. MAC was initiated. She was placed in dorsal lithotomy position. Exam under anesthesia performed. She was prepped and draped in normal sterile fashion. The bladder was drained. A sterile bivalved speculum was placed in the vagina. The cervix was very anterior and could not be actually maneuvered into the speculum without grasping the posterior lip of the cervix with the tenaculum. A paracervical block was administered. The cervix was gently dilated with hegar dilators to 6 mm. Uterus was sounded to 7 cm. The hysteroscopic system was calibrated. The hysteroscope was gently advanced into the endometrial cavity. The uterine cavity was visualized and the previous findings noted. The TruClear Mini hysteroscopic blade was then advanced through the hysteroscope under direct visualization applied to sample the tissue of the endometrium along all the walls of the uterus, isthmocele cavity, and upper endocervix. The tissue was sent to pathology. The hysteroscope was then removed from the uterus. The single tooth tenaculum was removed and good hemostasis was noted. Sponge, lap, needle, and instrument counts correct by two counts. The patient was taken to recovery room in stable condition.      DISPOSITION:  To recovery room in stable condition        CONDITION: Stable      Kandi Ward MD   [facility] - OBGYN

## 2023-09-22 LAB — B-HCG UR QL: NEGATIVE

## 2023-09-28 PROBLEM — N84.0 ENDOMETRIAL POLYP: Status: RESOLVED | Noted: 2023-07-17 | Resolved: 2023-09-28

## 2023-09-28 PROBLEM — Z98.890 STATUS POST HYSTEROSCOPIC POLYPECTOMY: Status: ACTIVE | Noted: 2023-09-21

## 2023-10-02 ENCOUNTER — OFFICE VISIT (OUTPATIENT)
Facility: CLINIC | Age: 43
End: 2023-10-02
Payer: COMMERCIAL

## 2023-10-02 ENCOUNTER — TELEPHONE (OUTPATIENT)
Dept: PHYSICAL THERAPY | Facility: HOSPITAL | Age: 43
End: 2023-10-02

## 2023-10-02 VITALS
HEIGHT: 67 IN | DIASTOLIC BLOOD PRESSURE: 62 MMHG | HEART RATE: 74 BPM | WEIGHT: 178 LBS | BODY MASS INDEX: 27.94 KG/M2 | SYSTOLIC BLOOD PRESSURE: 118 MMHG

## 2023-10-02 DIAGNOSIS — R19.8 IRREGULAR BOWEL HABITS: ICD-10-CM

## 2023-10-02 DIAGNOSIS — N94.6 DYSMENORRHEA: ICD-10-CM

## 2023-10-02 DIAGNOSIS — Z98.890 STATUS POST HYSTEROSCOPIC POLYPECTOMY: ICD-10-CM

## 2023-10-02 DIAGNOSIS — Z98.890 STATUS POST DILATION AND CURETTAGE: ICD-10-CM

## 2023-10-02 DIAGNOSIS — Z48.89 POSTOPERATIVE VISIT: Primary | ICD-10-CM

## 2023-10-02 DIAGNOSIS — N92.0 MENORRHAGIA WITH REGULAR CYCLE: ICD-10-CM

## 2023-10-02 DIAGNOSIS — N94.10 DYSPAREUNIA, FEMALE: ICD-10-CM

## 2023-10-02 DIAGNOSIS — D21.9 FIBROID: ICD-10-CM

## 2023-10-02 NOTE — PATIENT INSTRUCTIONS
Lubricants  Aloe Cadabra  Good Clean Love  Pre-Seed (targeted for those attempting to conceive)   Restore  *Lubricants that are hypo-osmolar or iso-osmolar are preferable to hyper-osmolar formulations. Vaginal moisturizers (Replens, Luvena)   Revaree hyaluronic acid vaginal suppositories. Serafin Santana Pelvic Floor Physical Therapy    [address], [address] [facility], [place], [address] [facility]. Ph: [phone]  [address], 2nd floor. [facility], [place]. Ph: [phone] & [phone]  [address], [place], [address] [facility]. Ph [phone]  [address], [address], [place] [address]. Ph [phone]  [address] [facility]. Ph [phone]  [address] [place] [address]. Ph [phone]      The Role of Physical Therapy in the Treatment of Pelvic Floor Dysfunction:    Physical therapists are trained to evaluate and treat dysfunctions in the joints, muscles, nerves and scar. Physical therapists specifically trained in the area of pelvic health can identify the possible musculoskeletal causes of pelvic pain, bladder and bowel difficulties and develop a treatment plan specific to the individual suffering from this difficulties. What to expect at your first physical therapy appointment:   Your first visit will include an initial evaluation in a comfortable, private room by a therapist who has undergone advanced education and training in the evaluation and treatment of pelvic muscle dysfunction. The therapist will obtain a detailed history of your health, pain and activity limitations. She will also ask you about any bowel, bladder and sexual difficulties as these are in part controlled by the pelvic muscles. The therapist will then take a look at your posture, mobility of your spine and hips and strength and flexibility of pelvic girdle muscles. She will examine any scar tissue and trigger points in the muscles of your pelvic region.      The therapist will also specifically examine the pelvic floor muscles. Your pelvic floor consists of a group of muscles that attach behind the pubic bone in the front to the tail bone in the back. They are responsible for providing support to the pelvic joints and organs, relaxing to allow the passage of urine, stool and gas and cayden to prevent the loss of urine, stool and gas as appropriate. In order to best examine these muscles you will be asked to undress from the waist down and be covered with a sheet. The therapist will use a lubricated, gloved finger to identify painful muscles around and in your vagina or rectum then instruct you to contract and relax these muscles in order to determine how the muscles are functioning. Care is taken to make you as comfortable as possible with the exam.     Your therapist will discuss the evaluation results with you and provide you with education regarding your specific condition and the expectation of therapy. She will answer all of your questions and will work with you to establish a treatment plan based on the results of the evaluation and your goals for therapy.

## 2023-10-30 ENCOUNTER — TELEPHONE (OUTPATIENT)
Dept: PHYSICAL THERAPY | Facility: HOSPITAL | Age: 43
End: 2023-10-30

## 2023-10-31 ENCOUNTER — TELEPHONE (OUTPATIENT)
Facility: CLINIC | Age: 43
End: 2023-10-31

## 2023-10-31 NOTE — TELEPHONE ENCOUNTER
Request for the last 5 years of all Medical Records came in via fax from [employer]. APS Dept  tel [phone]. Request forwarded to Med Rec department.

## 2023-11-13 ENCOUNTER — HOSPITAL ENCOUNTER (OUTPATIENT)
Dept: MAMMOGRAPHY | Facility: HOSPITAL | Age: 43
Discharge: HOME | End: 2023-11-13
Attending: OBSTETRICS & GYNECOLOGY
Payer: COMMERCIAL

## 2023-11-13 DIAGNOSIS — Z12.31 ENCOUNTER FOR SCREENING MAMMOGRAM FOR MALIGNANT NEOPLASM OF BREAST: ICD-10-CM

## 2023-11-13 PROCEDURE — 77067 SCR MAMMO BI INCL CAD: CPT | Performed by: OBSTETRICS & GYNECOLOGY

## 2023-11-13 PROCEDURE — 77063 BREAST TOMOSYNTHESIS BI: CPT | Performed by: OBSTETRICS & GYNECOLOGY

## 2024-01-05 ENCOUNTER — TELEPHONE (OUTPATIENT)
Dept: PHYSICAL THERAPY | Facility: HOSPITAL | Age: 44
End: 2024-01-05

## 2024-01-08 ENCOUNTER — OFFICE VISIT (OUTPATIENT)
Dept: PHYSICAL THERAPY | Facility: HOSPITAL | Age: 44
End: 2024-01-08
Attending: OBSTETRICS & GYNECOLOGY
Payer: COMMERCIAL

## 2024-01-08 DIAGNOSIS — R19.8 IRREGULAR BOWEL HABITS: ICD-10-CM

## 2024-01-08 DIAGNOSIS — N94.10 DYSPAREUNIA, FEMALE: Primary | ICD-10-CM

## 2024-01-08 PROCEDURE — 97140 MANUAL THERAPY 1/> REGIONS: CPT

## 2024-01-08 PROCEDURE — 97112 NEUROMUSCULAR REEDUCATION: CPT

## 2024-01-08 PROCEDURE — 97162 PT EVAL MOD COMPLEX 30 MIN: CPT

## 2024-01-08 NOTE — PROGRESS NOTES
MUSCULOSKELETAL AND PELVIC FLOOR EVALUATION:     Diagnosis:   Dyspareunia, female (N94.10)  Irregular bowel habits (R19.8)      Referring Provider: Irvin  Date of Evaluation:    2024    Precautions:  None Next MD visit:   none scheduled  Date of Surgery: n/a     PATIENT SUMMARY   Lana Schwab is a 43 year old female who presents to therapy today with complaints of pain with intercourse. Pain is with both superficial and deep penetration. Sometimes pain is minimal enough that she is able to continue, and the pain will dissipate, and other times the pain is severe enough that she has to stop. She also experiences pelvic floor muscle spasming, which she has experienced intermittently for a while; however, spasms are occurring more frequently now (1x/month). She previously had difficulty with bowel movements, but more recently, she reports defecating 1-3x per day, and she feels that she is emptying pretty well when she goes. Reports good fiber and water intake. Reports a little bit of recent stress incontinence, but no urgency issues. She does have neck pain chronically.     Current symptoms include: back pain and vaginal pain    Pt describes pain level: current 0/10, best 0/10, worst 5/10.   Quality: intermittent    Pregnant Now: No  Obstetrical/Gynecological history: : 2  Para: 2  Delivery method: C-sections  Urodynamic Test: NA  Manometry: NA  Occupation/Activities: Works in medical office, alternates between standing and sitting   PFDI-20: 75/300; Impairment= 25 %    Lana describes prior level of function as no pain with intercourse, bowel or bladder dysfunction. Pt goals include to resolve pain with intercourse and muscle spasms.   Past medical history was reviewed with Lana. Significant findings include  has a past medical history of Abdominal distention (), Abdominal pain (), Acquired hypothyroidism (04/10/2023), Adrenal cyst (HCC) (), ASCUS of cervix with  negative high risk HPV (2019), Blood in the stool (unsure), Dense breasts (11/08/2022), Disorder of thyroid, Elevated fasting glucose (04/17/2023), Esophageal reflux, Fatigue (approx 2017), Fibroids, Headache disorder (2000), Heartburn (2010), Heavy menses (2021), Hemorrhoids (2018), Hypothyroidism, IBS (irritable bowel syndrome), Indigestion, Iron deficiency anemia, Migraine without aura, Migraines, Pain with bowel movements (unsure), Pap smear for cervical cancer screening (05/08/2023), Serous cystadenoma of left ovary (2017), Status post hysteroscopic polypectomy (09/21/2023), Stress (2017), Visual impairment, and Wears glasses (2000).      URINARY HABITS  Types of symptoms: stress incontinence  Events associated with the onset of urinary complaints: insidious  Abdominal/Vaginal Pressure complaints: sometimes  Urinary Frequency: WNL  Urine Stream: WNL  Amount: WNL  Leaking occurs: with coughing, sneezing  Episodes of Leakage: <1 times per day  Amount of leakage: min  Pad use: NA  Pad Change frequency: NA      BOWEL HABITS  Types of symptoms: other WNL    Frequency of bowel movements: 1-3x/day  Stool consistency: [place] Stool Scale: 4  Do you strain with defecation: No   Laxative use: No    SEXUAL HEALTH STATUS  Marinoff Scale: 2-3  History of Sexual Abuse: NA  Sexual [place] Status: Active but sometimes unable due to pain  Pain with initial and/or deep penetration: both  Pain with pelvic exam/tampon use: yes    ASSESSMENT  Lana presents to physical therapy evaluation with primary c/o pain with intercourse. The results of the objective tests and measures show impaired PFM lengthening and contraction ability, TTP and notable tissue tension in bilateral PF musculature in all three layers, and sub-optimal breathing mechanics  Functional deficits include but are not limited to impaired ability to participate in intercourse.  Signs and symptoms are consistent with diagnosis of dyspareunia secondary to overactive  PFM. Pt and PT discussed evaluation findings, pathology, POC and HEP.  Pt voiced understanding and performs HEP correctly without reported pain. Skilled Pelvic Physical Therapy is medically necessary to address the above impairments and reach functional goals.    OBJECTIVE:   Posture: WNL    Transverse Abdominis: 3+/5    Informed consent for internal pelvic evaluation given: Yes    External Observation:   Voluntary contraction: present  but impaired  Voluntary relaxation: absent  Involuntary contraction: present  Involuntary relaxation: absent    Mons pubis: WNL  Labia majora: redness  Labia minora: redness  Urethral meatus: redness  Introitus: redness  Perineal body: WNL    Internal Examination     Pelvic Floor Muscle strength: (PERF= Power/Endurance/Reps/Fast) MMT: 2/2/3/NT  External Anal Sphincter: NT  Accessory Muscle Use: none      Eccentric lengthening contraction: impaired  Bearing down Valsalva maneuver (2-3x): NT    Breathing pattern: chest dominant at rest    Internal Palpation: WNL except Superficial Transverse Perineal SEMAJ moderate restriction and tenderness  Bulbocavernosus SEMAJ moderate restriction and tenderness  Ischiocavernosus SEMAJ minimal restriction  Deep Transverse Perineal   minimal restriction  Urethrovaginalis SEMAJ minimal restriction  Pubococcygeus/Pubovaginalis SEMAJ moderate restriction and tenderness  Iliococcygeus SEMAJ moderate restriction and tenderness  Coccygeus SEMAJ moderate restriction and tenderness    Today's Treatment and Response:   Patient education was provided on objective findings of external and internal evaluation and expectations with treatment outcomes. Educated on pelvic anatomy and function with diagrams and pelvic model and diaphragmatic breathing for PNS activation and pelvic floor relaxation     Neuro: 26'  Discussed psychosocial aspects of dyspareunia and utility of down-training PFM via diaphragmatic breathing and lumbopelvic stretching exercise. Cued in diaphragmatic  breathing in various positions with emphasis on pelvic floor elongation/relaxation. Discussed dilators/wand with samples and encouraged patient to consider purchasing dilator set to supplement therapy treatments.   Access Code: [email or web address]  Date: 01/09/2024  - Supine Diaphragmatic Breathing  - 1 x daily - 7 x weekly - 10 reps  - Diaphragmatic Breathing in Child's Pose with Pelvic Floor Relaxation  - 1 x daily - 7 x weekly - 10 reps  - Sidelying Diaphragmatic Breathing  - 1 x daily - 7 x weekly - 10 reps    Manual: 14'  Internal assessment performed and cues provided on proper PFM contraction, relaxation, and bearing down. Assessed cough reflex. Performed brief myofascial release bilaterally.         Charges: PT Eval Moderate Complexity, neuro x 2, manual x 1      Total Timed Treatment: 40 min     Total Treatment Time: 57 min     Based on clinical rationale and outcome measures, this evaluation involved Moderate Complexity decision making due to 1-2 personal factors/comorbidities, 3 body structures involved/activity limitations, and evolving symptoms including pelvic pain  PLAN OF CARE:    Goals: (to be met in 10 visits)  Patient will demonstrate optimal PFM elongation with coordinated breathing x 10 reps to alleviate PFM pain and muscle tension.  Patient will improve PERF score to at least 3/5/5//6 to mitigate stress incontinence symptoms and optimize pelvic organ support.   Patient will demonstrate normalized tone and minimal pain onset (</= 2/10) with internal assessment for increased tolerance to gynecological exam.   Patient will demonstrate hamstring muscle length WNL to alleviate tension in support structures of pelvic floor musculature.   Patient will report adherence to HEP for continued exercise benefits following cessation of PT.      Frequency / Duration: Patient will be seen for 1-2 x/week or a total of 10 visits over a 90 day period.  Treatment will include: Manual Therapy, Neuromuscular Re-education,  Self-Care Home Management, Therapeutic Activities, Therapeutic Exercise, and Home Exercise Program instruction     Education or treatment limitation: None  Rehab Potential:good      Patient/Family/Caregiver was advised of these findings, precautions, and treatment options and has agreed to actively participate in planning and for this course of care.    Thank you for your referral. Please co-sign or sign and return this letter via fax as soon as possible to [phone]. If you have any questions, please contact me at Dept: [phone]    Sincerely,  Electronically signed by therapist: Karina Ferguson PT  Physician's certification required: Yes  I certify the need for these services furnished under this plan of treatment and while under my care.    X___________________________________________________ Date____________________    Certification From: 1/8/2024  To:4/7/2024

## 2024-01-15 ENCOUNTER — OFFICE VISIT (OUTPATIENT)
Dept: PHYSICAL THERAPY | Facility: HOSPITAL | Age: 44
End: 2024-01-15
Attending: OBSTETRICS & GYNECOLOGY
Payer: COMMERCIAL

## 2024-01-15 PROCEDURE — 97140 MANUAL THERAPY 1/> REGIONS: CPT

## 2024-01-15 PROCEDURE — 97112 NEUROMUSCULAR REEDUCATION: CPT

## 2024-01-15 NOTE — PROGRESS NOTES
Diagnosis:   Dyspareunia, female (N94.10)  Irregular bowel habits (R19.8)         Referring Provider: Irvin  Date of Evaluation:    1/8/24    Precautions:  None Next MD visit:   none scheduled  Date of Surgery: n/a   Insurance Primary/Secondary: [employer] [place] PPO / N/A     # Auth Visits: 10 (25 total per insurance)            Subjective: Reports that she has bought a dilator set and was able to progress to the 3rd dilator, but she did notice up to 6/10 pain when she used it. She has also been practicing her breathing.     Pain: 6/10 with D3 use      Objective:     Informed consent for internal pelvic evaluation given: Yes     External Observation:   Voluntary contraction: present  but impaired  Voluntary relaxation: absent  Involuntary contraction: present  Involuntary relaxation: absent     Mons pubis: WNL  Labia majora: redness  Labia minora: redness  Urethral meatus: redness  Introitus: redness  Perineal body: WNL     Internal Examination      Pelvic Floor Muscle strength: (PERF= Power/Endurance/Reps/Fast) MMT: 2/2/3/NT  External Anal Sphincter: NT  Accessory Muscle Use: none        Eccentric lengthening contraction: impaired  Bearing down Valsalva maneuver (2-3x): NT     Breathing pattern: chest dominant at rest      Internal Palpation: WNL except Superficial Transverse Perineal SEMAJ moderate restriction and tenderness  Bulbocavernosus SEMAJ moderate restriction and tenderness  Ischiocavernosus SEMAJ minimal restriction  Deep Transverse Perineal   minimal restriction  Urethrovaginalis SEMAJ minimal restriction  Pubococcygeus/Pubovaginalis SEMAJ moderate restriction and tenderness  Iliococcygeus SEMAJ moderate restriction and tenderness  Coccygeus SEMAJ moderate restriction and tenderness    Posterior introitus: tension and tenderness (4/10)    Assessment: Reviewed utility of dilator use and encouraged patient to keep pain at or below a 5/10 with the dilator treatments to minimize reflexive guarding response of PFM. Internal  myofascial work today revealed residual TTP and tension in deep>superficial muscles, as well as in posterior introitus. Pain and tension resolved well with manual interventions and cues for breathing. Patient with rapidly improving PFM elongation ability with inhalation; however, proprioceptive awareness is still limited. Plan to add on to HEP next session.      Goals: (to be met in 10 visits)  Patient will demonstrate optimal PFM elongation with coordinated breathing x 10 reps to alleviate PFM pain and muscle tension.  Patient will improve PERF score to at least 3/5/5//6 to mitigate stress incontinence symptoms and optimize pelvic organ support.   Patient will demonstrate normalized tone and minimal pain onset (</= 2/10) with internal assessment for increased tolerance to gynecological exam.   Patient will demonstrate hamstring muscle length WNL to alleviate tension in support structures of pelvic floor musculature.   Patient will report adherence to HEP for continued exercise benefits following cessation of PT.    Plan: Continue with internal treatment. Continue with diaphragmatic breathing and lumbopelvic mobility exercise.  Date: 1/15/2024  TX#: 2/10 Date:                 TX#: 3/ Date:                 TX#: 4/ Date:                 TX#: 5/ Date:   Tx#: 6/   Neuro: 25'  Reviewed dilator utility and treatment protocol x 15 mins    Upward dog<>rock back stretch x 6    Open books with DB x 10 B    Seated DB x 3 mins           Manual: 20'  Internal myofascial release to all three layers bilaterally with increased emphasis on bilateral coccygeus and iliococcygeus                      HEP:   Dilator use x 15 mins 3-4 days per week  Access Code: [email or web address]  Date: 01/09/2024  - Supine Diaphragmatic Breathing  - 1 x daily - 7 x weekly - 10 reps  - Diaphragmatic Breathing in Child's Pose with Pelvic Floor Relaxation  - 1 x daily - 7 x weekly - 10 reps  - Sidelying Diaphragmatic Breathing  - 1 x daily - 7 x weekly - 10  reps    Charges: neuro x 2, manual x 1       Total Timed Treatment: 45 min  Total Treatment Time: 45 min

## 2024-01-22 ENCOUNTER — OFFICE VISIT (OUTPATIENT)
Dept: PHYSICAL THERAPY | Facility: HOSPITAL | Age: 44
End: 2024-01-22
Attending: OBSTETRICS & GYNECOLOGY
Payer: COMMERCIAL

## 2024-01-22 PROCEDURE — 97112 NEUROMUSCULAR REEDUCATION: CPT

## 2024-01-22 PROCEDURE — 97110 THERAPEUTIC EXERCISES: CPT

## 2024-01-22 NOTE — PROGRESS NOTES
Diagnosis:   Dyspareunia, female (N94.10)  Irregular bowel habits (R19.8)         Referring Provider: Irvin  Date of Evaluation:    1/8/24    Precautions:  None Next MD visit:   none scheduled  Date of Surgery: n/a   Insurance Primary/Secondary: [employer] PPO / N/A     # Auth Visits: 10 (25 total per insurance)            Subjective: Reports that she has been on her period and had a very busy week at work, so she has not had time to use the dilators. She did have one of those spasms when she was sitting and it lasted ~10 seconds and then disappeared. Spasms feel like they're occurring between the posterior vagina and rectal area.     Pain: 4/10 during muscle spasm        Objective:     Informed consent for internal pelvic evaluation given: Yes     External Observation:   Voluntary contraction: present  but impaired  Voluntary relaxation: absent  Involuntary contraction: present  Involuntary relaxation: absent     Mons pubis: WNL  Labia majora: redness  Labia minora: redness  Urethral meatus: redness  Introitus: redness  Perineal body: WNL     Internal Examination      Pelvic Floor Muscle strength: (PERF= Power/Endurance/Reps/Fast) MMT: 2/2/3/NT  External Anal Sphincter: NT  Accessory Muscle Use: none        Eccentric lengthening contraction: impaired  Bearing down Valsalva maneuver (2-3x): NT     Breathing pattern: chest dominant at rest      Internal Palpation: WNL except Superficial Transverse Perineal SEMAJ moderate restriction and tenderness  Bulbocavernosus SEMAJ moderate restriction and tenderness  Ischiocavernosus SEMAJ minimal restriction  Deep Transverse Perineal   minimal restriction  Urethrovaginalis SEMAJ minimal restriction  Pubococcygeus/Pubovaginalis SEMAJ moderate restriction and tenderness  Iliococcygeus SEMAJ moderate restriction and tenderness  Coccygeus SEMAJ moderate restriction and tenderness    Posterior introitus: tension and tenderness (4/10)    Assessment: Cued in diaphragmatic breathing exercise, as  well as stretching exercise today. Noted increased adductor tightness on L>R. Pt tolerated all exercise well and was able to perform all exercise, as prescribed. Added on to HEP and discussed continuing to prioritize breath work and dilator use for HEP. She verbalized understanding. Progress as tolerated.       Goals: (to be met in 10 visits)  Patient will demonstrate optimal PFM elongation with coordinated breathing x 10 reps to alleviate PFM pain and muscle tension.  Patient will improve PERF score to at least 3/5/5//6 to mitigate stress incontinence symptoms and optimize pelvic organ support.   Patient will demonstrate normalized tone and minimal pain onset (</= 2/10) with internal assessment for increased tolerance to gynecological exam.   Patient will demonstrate hamstring muscle length WNL to alleviate tension in support structures of pelvic floor musculature.   Patient will report adherence to HEP for continued exercise benefits following cessation of PT.    Plan: Continue with internal treatment. Continue with diaphragmatic breathing and lumbopelvic mobility exercise.  Date: 1/15/2024  TX#: 2/10 Date: 1/22/24              TX#: 3/10 Date:                 TX#: 4/ Date:                 TX#: 5/ Date:   Tx#: 6/   Neuro: 25'  Reviewed dilator utility and treatment protocol x 15 mins    Upward dog<>rock back stretch x 6    Open books with DB x 10 B    Seated DB x 3 mins     Neuro: 10'  Supine DB with 7 sec inhale/4 sec exhale x 4 mins    SL DB with 7 sec inhale/4 sec exhale x 3 mins    Cat/cow with DB x 20        Manual: 20'  Internal myofascial release to all three layers bilaterally with increased emphasis on bilateral coccygeus and iliococcygeus  There-ex: 31'  [place] open books x 10 B    Lateral trunk stretch x 1 min B    Thread the needle x 10 B    1/2 frog stretch x 90 sec B    Quadruped thoracic spine extension x 3 mins    Supine pelvic clocks x 10 B    HEP                        HEP:   Dilator use  x 15 mins 3-4 days per week  Access Code: [email or web address]  Date: 01/09/2024  - Supine Diaphragmatic Breathing  - 1 x daily - 7 x weekly - 10 reps  - Diaphragmatic Breathing in Child's Pose with Pelvic Floor Relaxation  - 1 x daily - 7 x weekly - 10 reps  - Sidelying Diaphragmatic Breathing  - 1 x daily - 7 x weekly - 10 reps    Access Code: [email or web address]  Date: 01/22/2024  - Sidelying Open Book Thoracic Lumbar Rotation and Extension  - 1 x daily - 7 x weekly - 10 reps  - Quadruped Thoracic Spine Extension  - 1 x daily - 7 x weekly - 2-3 min hold  - Cat Cow  - 1 x daily - 7 x weekly - 2 sets - 10 reps  - Quadruped Full Range Thoracic Rotation with Reach  - 1 x daily - 7 x weekly - 10 reps  - Quadruped Adductor Stretch  - 1 x daily - 7 x weekly - 1-2 min hold  - Supine Pelvic Clock  - 1 x daily - 7 x weekly - 10 reps  - Supine Pelvic Tilt  - 1 x daily - 7 x weekly - 10 reps  Charges: ther-ex x 2, neuro x 1       Total Timed Treatment: 41 min  Total Treatment Time: 43 min

## 2024-01-29 ENCOUNTER — OFFICE VISIT (OUTPATIENT)
Dept: PHYSICAL THERAPY | Facility: HOSPITAL | Age: 44
End: 2024-01-29
Attending: OBSTETRICS & GYNECOLOGY
Payer: COMMERCIAL

## 2024-01-29 PROCEDURE — 97112 NEUROMUSCULAR REEDUCATION: CPT

## 2024-01-29 PROCEDURE — 97140 MANUAL THERAPY 1/> REGIONS: CPT

## 2024-01-29 NOTE — PROGRESS NOTES
Diagnosis:   Dyspareunia, female (N94.10)  Irregular bowel habits (R19.8)         Referring Provider: Irvin  Date of Evaluation:    1/8/24    Precautions:  None Next MD visit:   none scheduled  Date of Surgery: n/a   Insurance Primary/Secondary: [employer] / N/A     # Auth Visits: 10 (25 total per insurance)            Subjective: Reports that she has been doing well overall. She still does not feel the ability to contract on the exhale.     Pain: 4/10         Objective:     Informed consent for internal pelvic evaluation given: Yes     External Observation:   Voluntary contraction: present  but impaired  Voluntary relaxation: absent  Involuntary contraction: present  Involuntary relaxation: absent     Mons pubis: WNL  Labia majora: redness  Labia minora: redness  Urethral meatus: redness  Introitus: redness  Perineal body: WNL     Internal Examination      Pelvic Floor Muscle strength: (PERF= Power/Endurance/Reps/Fast) MMT: 2/2/3/NT  External Anal Sphincter: NT  Accessory Muscle Use: none        Eccentric lengthening contraction: impaired  Bearing down Valsalva maneuver (2-3x): NT     Breathing pattern: chest dominant at rest      Internal Palpation: WNL except Superficial Transverse Perineal SEMAJ moderate restriction and tenderness  Bulbocavernosus SEMAJ moderate restriction and tenderness  Ischiocavernosus SEMAJ minimal restriction  Deep Transverse Perineal   minimal restriction  Urethrovaginalis SEMAJ minimal restriction  Pubococcygeus/Pubovaginalis SEMAJ moderate restriction and tenderness  Iliococcygeus SEMAJ moderate restriction and tenderness  Coccygeus SEMAJ moderate restriction and tenderness    Posterior introitus: tension and tenderness (4/10)    Assessment: With cues to perform PFM contraction and relaxation with coordinated breathing, noted gradually improving PFM lengthening on inhale and fair, partial range contraction on exhale. Encouraged patient to continue this when practicing DB with most emphasis on  PFM relaxation. Explained that inability to feel PFM contractions is likely due to suboptimal contraction ability from overactive PFM. Pt with continued tenderness and tension in R>L deep ms layer and perineum. Symptoms continue to resolve well with myofascial release, indicating favorable response to treatment.      Goals: (to be met in 10 visits)  Patient will demonstrate optimal PFM elongation with coordinated breathing x 10 reps to alleviate PFM pain and muscle tension.  Patient will improve PERF score to at least 3/5/5//6 to mitigate stress incontinence symptoms and optimize pelvic organ support.   Patient will demonstrate normalized tone and minimal pain onset (</= 2/10) with internal assessment for increased tolerance to gynecological exam.   Patient will demonstrate hamstring muscle length WNL to alleviate tension in support structures of pelvic floor musculature.   Patient will report adherence to HEP for continued exercise benefits following cessation of PT.    Plan: Continue with internal treatment. Continue with diaphragmatic breathing and lumbopelvic mobility exercise. Continue with flex bar perineal releases.   Date: 1/15/2024  TX#: 2/10 Date: 1/22/24              TX#: 3/10 Date: 1/29/24                TX#: 4/10 Date:                 TX#: 5/ Date:   Tx#: 6/   Neuro: 25'  Reviewed dilator utility and treatment protocol x 15 mins    Upward dog<>rock back stretch x 6    Open books with DB x 10 B    Seated DB x 3 mins     Neuro: 10'  Supine DB with 7 sec inhale/4 sec exhale x 4 mins    SL DB with 7 sec inhale/4 sec exhale x 3 mins    Cat/cow with DB x 20   Neuro: 14'  Seated flex bar perineal release x 2 min B    Upward dog<>rock back stretch with DB x 6    Quadruped side stretch with DB x 1 min B    Butterfly pelvic tilts with DB x 2 mins    Long sitting spine stretch with DB x 1 min B       Manual: 20'  Internal myofascial release to all three layers bilaterally with increased emphasis on bilateral  coccygeus and iliococcygeus  There-ex: 31'  [place] open books x 10 B    Lateral trunk stretch x 1 min B    Thread the needle x 10 B    1/2 frog stretch x 90 sec B    Quadruped thoracic spine extension x 3 mins    Supine pelvic clocks x 10 B    HEP     Manual: 25'  Internal myofascial release to all three layers bilaterally with increased emphasis on bilateral coccygeus and iliococcygeus, as well as posterior introitus                    HEP:   Dilator use x 15 mins 3-4 days per week  Access Code: [email or web address]  Date: 01/09/2024  - Supine Diaphragmatic Breathing  - 1 x daily - 7 x weekly - 10 reps  - Diaphragmatic Breathing in Child's Pose with Pelvic Floor Relaxation  - 1 x daily - 7 x weekly - 10 reps  - Sidelying Diaphragmatic Breathing  - 1 x daily - 7 x weekly - 10 reps    Access Code: [email or web address]  Date: 01/22/2024  - Sidelying Open Book Thoracic Lumbar Rotation and Extension  - 1 x daily - 7 x weekly - 10 reps  - Quadruped Thoracic Spine Extension  - 1 x daily - 7 x weekly - 2-3 min hold  - Cat Cow  - 1 x daily - 7 x weekly - 2 sets - 10 reps  - Quadruped Full Range Thoracic Rotation with Reach  - 1 x daily - 7 x weekly - 10 reps  - Quadruped Adductor Stretch  - 1 x daily - 7 x weekly - 1-2 min hold  - Supine Pelvic Clock  - 1 x daily - 7 x weekly - 10 reps  - Supine Pelvic Tilt  - 1 x daily - 7 x weekly - 10 reps    Charges: manual x 2, neuro x 1       Total Timed Treatment: 39 min  Total Treatment Time: 42 min

## 2024-02-05 ENCOUNTER — APPOINTMENT (OUTPATIENT)
Dept: PHYSICAL THERAPY | Facility: HOSPITAL | Age: 44
End: 2024-02-05
Attending: OBSTETRICS & GYNECOLOGY
Payer: COMMERCIAL

## 2024-02-12 ENCOUNTER — APPOINTMENT (OUTPATIENT)
Dept: PHYSICAL THERAPY | Facility: HOSPITAL | Age: 44
End: 2024-02-12
Attending: OBSTETRICS & GYNECOLOGY
Payer: COMMERCIAL

## 2024-02-19 ENCOUNTER — OFFICE VISIT (OUTPATIENT)
Dept: PHYSICAL THERAPY | Facility: HOSPITAL | Age: 44
End: 2024-02-19
Attending: OBSTETRICS & GYNECOLOGY
Payer: COMMERCIAL

## 2024-02-19 PROCEDURE — 97112 NEUROMUSCULAR REEDUCATION: CPT

## 2024-02-19 PROCEDURE — 97110 THERAPEUTIC EXERCISES: CPT

## 2024-02-19 NOTE — PROGRESS NOTES
Diagnosis:   Dyspareunia, female (N94.10)  Irregular bowel habits (R19.8)         Referring Provider: Irvin  Date of Evaluation:    1/8/24    Precautions:  None Next MD visit:   none scheduled  Date of Surgery: n/a   Insurance Primary/Secondary: [employer] [place] / N/A     # Auth Visits: 10 (25 total per insurance)            Subjective: Reports that she has a lot going on at home right now, so her next appointment will likely be her last one. She has been feeling a little more constipated lately, which she thinks may be contributing to her other symptoms.     Pain: 4/10         Objective:     Informed consent for internal pelvic evaluation given: Yes     External Observation:   Voluntary contraction: present  but impaired  Voluntary relaxation: absent  Involuntary contraction: present  Involuntary relaxation: absent     Mons pubis: WNL  Labia majora: redness  Labia minora: redness  Urethral meatus: redness  Introitus: redness  Perineal body: WNL     Internal Examination      Pelvic Floor Muscle strength: (PERF= Power/Endurance/Reps/Fast) MMT: 2/2/3/NT  External Anal Sphincter: NT  Accessory Muscle Use: none        Eccentric lengthening contraction: impaired  Bearing down Valsalva maneuver (2-3x): NT     Breathing pattern: chest dominant at rest      Internal Palpation: WNL except Superficial Transverse Perineal SEMAJ moderate restriction and tenderness  Bulbocavernosus SEMAJ moderate restriction and tenderness  Ischiocavernosus SEMAJ minimal restriction  Deep Transverse Perineal   minimal restriction  Urethrovaginalis SEMAJ minimal restriction  Pubococcygeus/Pubovaginalis SEMAJ moderate restriction and tenderness  Iliococcygeus SEMAJ moderate restriction and tenderness  Coccygeus SEMAJ moderate restriction and tenderness    Posterior introitus: tension and tenderness (4/10)    2/19: BSS grade 2-3    Assessment: Focus of today's session was on optimal bowel health and function. Discussed how constipation can contribute to other PFM  dysfunction. Discussed optimal fiber and water intake, \"belly big, belly hard\" for optimal emptying, ILU massage, torso vascularization exercise, constipation recipe, and establishing a regular bowel routine. Plan for one more session and then potential discharge if pt has made sufficient progress towards all LTGs.      Goals: (to be met in 10 visits)  Patient will demonstrate optimal PFM elongation with coordinated breathing x 10 reps to alleviate PFM pain and muscle tension.  Patient will improve PERF score to at least 3/5/5//6 to mitigate stress incontinence symptoms and optimize pelvic organ support.   Patient will demonstrate normalized tone and minimal pain onset (</= 2/10) with internal assessment for increased tolerance to gynecological exam.   Patient will demonstrate hamstring muscle length WNL to alleviate tension in support structures of pelvic floor musculature.   Patient will report adherence to HEP for continued exercise benefits following cessation of PT.    Plan: Reassess and discharge if pt has made adequate progress towards LTGs.  Date: 1/15/2024  TX#: 2/10 Date: 1/22/24              TX#: 3/10 Date: 1/29/24                TX#: 4/10 Date: 2/19/24                TX#: 5/10 Date:   Tx#: 6/   Neuro: 25'  Reviewed dilator utility and treatment protocol x 15 mins    Upward dog<>rock back stretch x 6    Open books with DB x 10 B    Seated DB x 3 mins     Neuro: 10'  Supine DB with 7 sec inhale/4 sec exhale x 4 mins    SL DB with 7 sec inhale/4 sec exhale x 3 mins    Cat/cow with DB x 20   Neuro: 14'  Seated flex bar perineal release x 2 min B    Upward dog<>rock back stretch with DB x 6    Quadruped side stretch with DB x 1 min B    Butterfly pelvic tilts with DB x 2 mins    Long sitting spine stretch with DB x 1 min B   Neuro: 28'  Reviewed pelvic floor and pelvic organ anatomy and physiology. Discussed causes of constipation, as well as management strategies. Discussed optimal fiber and water intake.  Discussed use of squatty potty and \"belly big, belly hard\" technique to optimize PFM relaxation and promote bowel emptying without straining. Discussed use of ILU massage to simulate peristaltic contraction and improve stool transit time. Provided and discussed 5 handouts including constipation recipe.    Seated \"belly big, belly hard\" with elevated legs on stool to increase JUSTIN      Manual: 20'  Internal myofascial release to all three layers bilaterally with increased emphasis on bilateral coccygeus and iliococcygeus  There-ex: 31'  Around [place] open books x 10 B    Lateral trunk stretch x 1 min B    Thread the needle x 10 B    1/2 frog stretch x 90 sec B    Quadruped thoracic spine extension x 3 mins    Supine pelvic clocks x 10 B    HEP     Manual: 25'  Internal myofascial release to all three layers bilaterally with increased emphasis on bilateral coccygeus and iliococcygeus, as well as posterior introitus  There-ex: 12'  Torso vascularization exercise:    SL open books x 10 B    Prone press ups x 10    Supine SKTC x 10 B    Supine trunk rotations x 10 B    Deep squat x 1 min                  HEP:   Dilator use x 15 mins 3-4 days per week  Access Code: [email or web address]  Date: 01/09/2024  - Supine Diaphragmatic Breathing  - 1 x daily - 7 x weekly - 10 reps  - Diaphragmatic Breathing in Child's Pose with Pelvic Floor Relaxation  - 1 x daily - 7 x weekly - 10 reps  - Sidelying Diaphragmatic Breathing  - 1 x daily - 7 x weekly - 10 reps    Access Code: [email or web address]  Date: 01/22/2024  - Sidelying Open Book Thoracic Lumbar Rotation and Extension  - 1 x daily - 7 x weekly - 10 reps  - Quadruped Thoracic Spine Extension  - 1 x daily - 7 x weekly - 2-3 min hold  - Cat Cow  - 1 x daily - 7 x weekly - 2 sets - 10 reps  - Quadruped Full Range Thoracic Rotation with Reach  - 1 x daily - 7 x weekly - 10 reps  - Quadruped Adductor Stretch  - 1 x daily - 7 x weekly - 1-2 min hold  - Supine Pelvic Clock  - 1 x daily - 7 x weekly - 10  reps  - Supine Pelvic Tilt  - 1 x daily - 7 x weekly - 10 reps    Charges: ther-ex x 1, neuro x 2       Total Timed Treatment: 40 min  Total Treatment Time: 40 min

## 2024-02-26 ENCOUNTER — APPOINTMENT (OUTPATIENT)
Dept: PHYSICAL THERAPY | Facility: HOSPITAL | Age: 44
End: 2024-02-26
Attending: OBSTETRICS & GYNECOLOGY
Payer: COMMERCIAL

## 2024-03-04 ENCOUNTER — APPOINTMENT (OUTPATIENT)
Dept: PHYSICAL THERAPY | Facility: HOSPITAL | Age: 44
End: 2024-03-04
Attending: OBSTETRICS & GYNECOLOGY
Payer: COMMERCIAL

## 2024-03-11 ENCOUNTER — OFFICE VISIT (OUTPATIENT)
Dept: PHYSICAL THERAPY | Facility: HOSPITAL | Age: 44
End: 2024-03-11
Attending: OBSTETRICS & GYNECOLOGY
Payer: COMMERCIAL

## 2024-03-11 PROCEDURE — 97112 NEUROMUSCULAR REEDUCATION: CPT

## 2024-03-11 PROCEDURE — 97110 THERAPEUTIC EXERCISES: CPT

## 2024-03-11 PROCEDURE — 97140 MANUAL THERAPY 1/> REGIONS: CPT

## 2024-03-11 NOTE — PROGRESS NOTES
Diagnosis:   Dyspareunia, female (N94.10)  Irregular bowel habits (R19.8)         Referring Provider: Irvin  Date of Evaluation:    1/8/24    Precautions:  None Next MD visit:   none scheduled  Date of Surgery: n/a   Insurance Primary/Secondary: [employer] [place] / N/A     # Auth Visits: 10 (25 total per insurance)            Subjective: Reports that her bowel issues have been a little worse, but she does feel that the vaginal pain has improved. Reports that she will likely need to discontinue PT at this time, as she has too much going on in her personal life right now.     Pain: 4/10         Objective:     Informed consent for internal pelvic evaluation given: Yes     External Observation:   Voluntary contraction: present  but impaired  Voluntary relaxation: absent  Involuntary contraction: present  Involuntary relaxation: absent     Mons pubis: WNL  Labia majora: redness  Labia minora: redness  Urethral meatus: redness  Introitus: redness  Perineal body: WNL     Internal Examination      Pelvic Floor Muscle strength: (PERF= Power/Endurance/Reps/Fast) MMT: 2/2/3/NT  External Anal Sphincter: NT  Accessory Muscle Use: none        Eccentric lengthening contraction: impaired  Bearing down Valsalva maneuver (2-3x): NT     Breathing pattern: chest dominant at rest      Internal Palpation: WNL except Superficial Transverse Perineal SEMAJ moderate restriction and tenderness  Bulbocavernosus SEMAJ moderate restriction and tenderness  Ischiocavernosus SEMAJ minimal restriction  Deep Transverse Perineal   minimal restriction  Urethrovaginalis SEMAJ minimal restriction  Pubococcygeus/Pubovaginalis SEMAJ moderate restriction and tenderness  Iliococcygeus SEMAJ moderate restriction and tenderness  Coccygeus SEMAJ moderate restriction and tenderness    Posterior introitus: tension and tenderness (4/10)    2/19: BSS grade 2-3    Assessment: Patient verbalized that she may not be able to return to PT anytime soon, so updated HEP today to include  use of dilators intermittently, as well as gentle stretching and breathing exercise. Also performed soft tissue work on lumbosacral region today due to recent muscle strain. Encouraged patient to continue to engage in gentle multiplanar mobility to stimulate blood flow to the affected area. She verbalized understanding and agreement. Encouraged her to follow up with questions or concerns.      Goals: (to be met in 10 visits)  Patient will demonstrate optimal PFM elongation with coordinated breathing x 10 reps to alleviate PFM pain and muscle tension.  Patient will improve PERF score to at least 3/5/5//6 to mitigate stress incontinence symptoms and optimize pelvic organ support.   Patient will demonstrate normalized tone and minimal pain onset (</= 2/10) with internal assessment for increased tolerance to gynecological exam.   Patient will demonstrate hamstring muscle length WNL to alleviate tension in support structures of pelvic floor musculature.   Patient will report adherence to HEP for continued exercise benefits following cessation of PT.    Plan: Trial discharge.   Date: 1/15/2024  TX#: 2/10 Date: 1/22/24              TX#: 3/10 Date: 1/29/24                TX#: 4/10 Date: 2/19/24                TX#: 5/10 Date: 3/11/24  Tx#: 6/10   Neuro: 25'  Reviewed dilator utility and treatment protocol x 15 mins    Upward dog<>rock back stretch x 6    Open books with DB x 10 B    Seated DB x 3 mins     Neuro: 10'  Supine DB with 7 sec inhale/4 sec exhale x 4 mins    SL DB with 7 sec inhale/4 sec exhale x 3 mins    Cat/cow with DB x 20   Neuro: 14'  Seated flex bar perineal release x 2 min B    Upward dog<>rock back stretch with DB x 6    Quadruped side stretch with DB x 1 min B    Butterfly pelvic tilts with DB x 2 mins    Long sitting spine stretch with DB x 1 min B   Neuro: 28'  Reviewed pelvic floor and pelvic organ anatomy and physiology. Discussed causes of constipation, as well as management strategies. Discussed  optimal fiber and water intake. Discussed use of squatty potty and \"belly big, belly hard\" technique to optimize PFM relaxation and promote bowel emptying without straining. Discussed use of ILU massage to simulate peristaltic contraction and improve stool transit time. Provided and discussed 5 handouts including constipation recipe.    Seated \"belly big, belly hard\" with elevated legs on stool to increase JUSTIN   Neuro: 10'  Discussed HEP for continued progress towards goals including ideal dilator use, stretching/breathing and mobility exercise   Manual: 20'  Internal myofascial release to all three layers bilaterally with increased emphasis on bilateral coccygeus and iliococcygeus  There-ex: 31'  Around the [place] open books x 10 B    Lateral trunk stretch x 1 min B    Thread the needle x 10 B    1/2 frog stretch x 90 sec B    Quadruped thoracic spine extension x 3 mins    Supine pelvic clocks x 10 B    HEP     Manual: 25'  Internal myofascial release to all three layers bilaterally with increased emphasis on bilateral coccygeus and iliococcygeus, as well as posterior introitus  There-ex: 12'  Torso vascularization exercise:    SL open books x 10 B    Prone press ups x 10    Supine SKTC x 10 B    Supine trunk rotations x 10 B    Deep squat x 1 min Manual: 15'  Lumbosacral STM, skin rolling       There-ex: 15'  Lower trunk rotation x 20 B    Supine priformis stretch x 1 min B    Seated 3 way roll outs with ball x 10    Hamstring stretch in seated position x 1 min B    HEP          HEP:   Dilator use x 15 mins 3-4 days per week  Access Code: [email or web address]  Date: 01/09/2024  - Supine Diaphragmatic Breathing  - 1 x daily - 7 x weekly - 10 reps  - Diaphragmatic Breathing in Child's Pose with Pelvic Floor Relaxation  - 1 x daily - 7 x weekly - 10 reps  - Sidelying Diaphragmatic Breathing  - 1 x daily - 7 x weekly - 10 reps    Access Code: [email or web address]  Date: 01/22/2024  - Sidelying Open Book Thoracic Lumbar Rotation and Extension   - 1 x daily - 7 x weekly - 10 reps  - Quadruped Thoracic Spine Extension  - 1 x daily - 7 x weekly - 2-3 min hold  - Cat Cow  - 1 x daily - 7 x weekly - 2 sets - 10 reps  - Quadruped Full Range Thoracic Rotation with Reach  - 1 x daily - 7 x weekly - 10 reps  - Quadruped Adductor Stretch  - 1 x daily - 7 x weekly - 1-2 min hold  - Supine Pelvic Clock  - 1 x daily - 7 x weekly - 10 reps  - Supine Pelvic Tilt  - 1 x daily - 7 x weekly - 10 reps  Access Code: [email or web address]  URL: [email or web address]  Date: 03/11/2024  - Supine Lower Trunk Rotation  - 1 x daily - 7 x weekly - 2 sets - 10 reps  - Supine Figure 4 Piriformis Stretch  - 1 x daily - 7 x weekly - 3 reps - 1 min hold  - Seated Hamstring Stretch  - 1 x daily - 7 x weekly - 3 reps - 1 min hold  - Seated Flexion Stretch  - 1 x daily - 7 x weekly - 2 reps - 1 min hold  - Seated Thoracic Flexion and Rotation with Swiss Ball  - 1 x daily - 7 x weekly - 10 reps    Charges: ther-ex x 1, neuro x 1, manual x 1       Total Timed Treatment: 40 min  Total Treatment Time: 40 min

## 2024-03-15 ENCOUNTER — PATIENT MESSAGE (OUTPATIENT)
Dept: INTERNAL MEDICINE CLINIC | Facility: CLINIC | Age: 44
End: 2024-03-15

## 2024-03-15 DIAGNOSIS — Z13.0 SCREENING FOR BLOOD DISEASE: ICD-10-CM

## 2024-03-15 DIAGNOSIS — E03.9 ACQUIRED HYPOTHYROIDISM: Primary | ICD-10-CM

## 2024-03-15 DIAGNOSIS — Z13.220 SCREENING FOR LIPID DISORDERS: ICD-10-CM

## 2024-03-15 DIAGNOSIS — R73.01 ELEVATED FASTING GLUCOSE: ICD-10-CM

## 2024-03-15 DIAGNOSIS — Z00.00 ENCOUNTER FOR ANNUAL PHYSICAL EXAM: ICD-10-CM

## 2024-03-18 NOTE — TELEPHONE ENCOUNTER
From: Lana Schwab  To: Martha Frost  Sent: 3/15/2024 4:00 PM CDT  Subject: thyroid testing    Hi Martha,    Was hoping to get my thyroid levels rechecked. I went to a lower dose at some point and since then my weight has been drifting upward in spite o exercise and diet . Can you order for me thyroid testing, also can I check fasting glucose as last time it was borderline high and with my weight gain I would like to recheck for insulin resistance. Thanks in advance!    Lana NATARAJAN

## 2024-03-18 NOTE — TELEPHONE ENCOUNTER
Last TSH 4/17/23 0.701    Please advise on tsh and insulin resistance order?     Future Appointments   Date Time Provider Department Center   5/13/2024 10:00 AM Caro Bryan MD [facility] [place]

## 2024-03-18 NOTE — TELEPHONE ENCOUNTER
We don't do any insulin resistance testing. We can check the A1C. She will be coming due for PE next month. Please have her schedule and do all routine labs prior. Please order CBC, CMP, A1C, TSH (no reflex), and lipid. Thanks!

## 2024-03-19 ENCOUNTER — LAB ENCOUNTER (OUTPATIENT)
Dept: LAB | Age: 44
End: 2024-03-19
Attending: NURSE PRACTITIONER
Payer: COMMERCIAL

## 2024-03-19 DIAGNOSIS — R73.01 ELEVATED FASTING GLUCOSE: ICD-10-CM

## 2024-03-19 DIAGNOSIS — Z13.0 SCREENING FOR BLOOD DISEASE: ICD-10-CM

## 2024-03-19 DIAGNOSIS — Z13.220 SCREENING FOR LIPID DISORDERS: ICD-10-CM

## 2024-03-19 DIAGNOSIS — E03.9 ACQUIRED HYPOTHYROIDISM: ICD-10-CM

## 2024-03-19 DIAGNOSIS — Z00.00 ENCOUNTER FOR ANNUAL PHYSICAL EXAM: ICD-10-CM

## 2024-03-19 LAB
ALBUMIN SERPL-MCNC: 3.9 G/DL (ref 3.4–5)
ALBUMIN/GLOB SERPL: 1.3 {RATIO} (ref 1–2)
ALP LIVER SERPL-CCNC: 46 U/L
ALT SERPL-CCNC: 28 U/L
ANION GAP SERPL CALC-SCNC: 3 MMOL/L (ref 0–18)
AST SERPL-CCNC: 18 U/L (ref 15–37)
BASOPHILS # BLD AUTO: 0.04 X10(3) UL (ref 0–0.2)
BASOPHILS NFR BLD AUTO: 0.5 %
BILIRUB SERPL-MCNC: 0.3 MG/DL (ref 0.1–2)
BUN BLD-MCNC: 12 MG/DL (ref 9–23)
CALCIUM BLD-MCNC: 8.8 MG/DL (ref 8.5–10.1)
CHLORIDE SERPL-SCNC: 108 MMOL/L (ref 98–112)
CHOLEST SERPL-MCNC: 187 MG/DL (ref ?–200)
CO2 SERPL-SCNC: 26 MMOL/L (ref 21–32)
CREAT BLD-MCNC: 0.99 MG/DL
EGFRCR SERPLBLD CKD-EPI 2021: 72 ML/MIN/1.73M2 (ref 60–?)
EOSINOPHIL # BLD AUTO: 0.45 X10(3) UL (ref 0–0.7)
EOSINOPHIL NFR BLD AUTO: 5.9 %
ERYTHROCYTE [DISTWIDTH] IN BLOOD BY AUTOMATED COUNT: 11.9 %
EST. AVERAGE GLUCOSE BLD GHB EST-MCNC: 111 MG/DL (ref 68–126)
FASTING PATIENT LIPID ANSWER: YES
FASTING STATUS PATIENT QL REPORTED: YES
GLOBULIN PLAS-MCNC: 3.1 G/DL (ref 2.8–4.4)
GLUCOSE BLD-MCNC: 110 MG/DL (ref 70–99)
HBA1C MFR BLD: 5.5 % (ref ?–5.7)
HCT VFR BLD AUTO: 42.5 %
HDLC SERPL-MCNC: 81 MG/DL (ref 40–59)
HGB BLD-MCNC: 13.7 G/DL
IMM GRANULOCYTES # BLD AUTO: 0.03 X10(3) UL (ref 0–1)
IMM GRANULOCYTES NFR BLD: 0.4 %
LDLC SERPL CALC-MCNC: 91 MG/DL (ref ?–100)
LYMPHOCYTES # BLD AUTO: 2.59 X10(3) UL (ref 1–4)
LYMPHOCYTES NFR BLD AUTO: 34.1 %
MCH RBC QN AUTO: 31.9 PG (ref 26–34)
MCHC RBC AUTO-ENTMCNC: 32.2 G/DL (ref 31–37)
MCV RBC AUTO: 99.1 FL
MONOCYTES # BLD AUTO: 0.72 X10(3) UL (ref 0.1–1)
MONOCYTES NFR BLD AUTO: 9.5 %
NEUTROPHILS # BLD AUTO: 3.76 X10 (3) UL (ref 1.5–7.7)
NEUTROPHILS # BLD AUTO: 3.76 X10(3) UL (ref 1.5–7.7)
NEUTROPHILS NFR BLD AUTO: 49.6 %
NONHDLC SERPL-MCNC: 106 MG/DL (ref ?–130)
OSMOLALITY SERPL CALC.SUM OF ELEC: 284 MOSM/KG (ref 275–295)
PLATELET # BLD AUTO: 245 10(3)UL (ref 150–450)
POTASSIUM SERPL-SCNC: 4.3 MMOL/L (ref 3.5–5.1)
PROT SERPL-MCNC: 7 G/DL (ref 6.4–8.2)
RBC # BLD AUTO: 4.29 X10(6)UL
SODIUM SERPL-SCNC: 137 MMOL/L (ref 136–145)
TRIGL SERPL-MCNC: 83 MG/DL (ref 30–149)
TSI SER-ACNC: 0.54 MIU/ML (ref 0.36–3.74)
VLDLC SERPL CALC-MCNC: 13 MG/DL (ref 0–30)
WBC # BLD AUTO: 7.6 X10(3) UL (ref 4–11)

## 2024-03-19 PROCEDURE — 85025 COMPLETE CBC W/AUTO DIFF WBC: CPT

## 2024-03-19 PROCEDURE — 80053 COMPREHEN METABOLIC PANEL: CPT

## 2024-03-19 PROCEDURE — 83036 HEMOGLOBIN GLYCOSYLATED A1C: CPT

## 2024-03-19 PROCEDURE — 84443 ASSAY THYROID STIM HORMONE: CPT

## 2024-03-19 PROCEDURE — 36415 COLL VENOUS BLD VENIPUNCTURE: CPT

## 2024-03-19 PROCEDURE — 80061 LIPID PANEL: CPT

## 2024-05-12 PROBLEM — Z98.890 STATUS POST HYSTEROSCOPIC POLYPECTOMY: Status: RESOLVED | Noted: 2023-09-21 | Resolved: 2024-05-12

## 2024-05-12 PROBLEM — Z98.890 STATUS POST DILATION AND CURETTAGE: Status: RESOLVED | Noted: 2023-09-21 | Resolved: 2024-05-12

## 2024-05-12 NOTE — H&P
[facility]  Obstetrics and Gynecology  H&P  Established     CC:   Chief Complaint   Patient presents with    Wellness Visit    Gyn Problem     Pt c/o abdominal full nest and discomfort.       Subjective:     Lana Schwab is a 43 year old  female Patient's last menstrual period was 2024 (exact date).   Here for WWE c/o abdominal fullness & discomfort. Known fibroids.     Chaperone declines      PMHx: stable 7.1 cm dominant fibroid with degeneration changes, menorrhagia, dysmenorrhea, Longstanding dyspareunia, IBS, Intermenstrual cramping, endometrial polyp, mood changes/irritability, elevated fasting glucose (2023 Glu 101 -> 3/2024 Glu 110)     10/2/23 Postop visit. No pain or bleeding. Declines hormonal Rx. Would like to observe bleeding pattern with periods.     As of today, 24, Since last visit  Fullness in abdomen about one time every few months. Not terrible. Mid cycle feeling.   Still gets some mid cycle cramping   Periods -  not as heavy as it was. 23-26 days.   Rare hot flashes   PFPT attended - improvement in vaginal pain, though bowels a little worse. Busy in personal life so sessions discontinued after 3/11/24. (PT note from 3/11/24 still indicates moderate restriction and tenderness in much of the pelvic floor)   Mood - improved    Elevated fasting glucose persists/worsening - thinks it was coffee related  Weight lifting - Y    PCP Sirena Peña MD     Review of Systems   Constitutional:  Positive for unexpected weight change.        Gaining slowly over years.   Exercise - biking & long walks. Has started some Weight lifting.   Diet - does like sweets. Has tapered it down.     Following \"glucose goddess\" - has been doing for about 1 month    Unable to lose weight.   Avoiding processed foods   Gastrointestinal:  Positive for blood in stool, constipation, diarrhea and rectal pain.        \"IBS\" whole life.   Can be diarrhea or constipation. Average BM 3-4 per  day  Often some blood - at least 1 time per week. +Int hemorrhoids.   Often dyschezia.      As of 3/2023 - waking up to have BM. +Bloating. Occ nausea. No vomiting.   9/2023 EGD gastritis. Colonoscopy internal hemorrhoids.    Genitourinary:  Positive for dyspareunia, menstrual problem and pelvic pain. Negative for dysuria, frequency and vaginal discharge.        H/o menses heavier & painful. [place] cycles. No intermenstrual bleeding.      7/2023 - Periods not very heavy. Intermenstrual central pelvic cramping still there. Not as bad as a few months back. More frequent but not very intense. +Pain between periods about 1-2 times per month. Onset for 1+ years. Will last 1 hour or so, discomfort/pressure/fullness. Random onset. Timing with cycle - can't say for sure. Not too worried about the fibroid itself    5/2024  Periods - regular, not as heavy  Intermenstrual cramping - mild       +Dyspareunia - has always had. Maybe some dryness. Does use lubricant. Does not help.   Not as much dyspareunia  Gradually less intercourse. Libido is lower    Postcoital bleeding? No   +RAFAEL - Rare leaking urine laugh - not too bad.  PFPT helped    Pelvic floor PT  -improvement in vaginal pain, though bowels a little worse. Busy in personal life so sessions discontinued after 3/11/24. (PT note from 3/11/24 still indicates moderate restriction and tenderness in much of the pelvic floor)   Skin:         Breasts - no issues  Hirsutism - has had a few hairs & laser hair removal  Acne in the past.    Neurological:  Positive for headaches.        Headaches - improving with time. Once every 1-2 months. More stress/tension. Few migraines. No aura.     Psychiatric/Behavioral:  Positive for dysphoric mood and sleep disturbance.         Mood -   Was having irritability - better overall.   Since kids were diagnosed with autism & ADHD has been tough. Sometimes overwhelmed.    Therapist  -has in the past after kids are were diagnosed. Was helpful.    -declines to return      Medications  -in the distant past took Celexa or Wellbutrin. Does think it helped a little. Prefers to not take medications.    Meditation  -hard for her    Sleep - waking up for \"no reason\" within the past 3-4 am. Around 1 am to 3 am.   Not hard to fall asleep.   Magnesium helps.   About 7 hr of sleep      GYN Hx:   Menarche 11  Initially - Menses q 28-30 x 3-5 days x heavy to light flow.      - Cycles shorter in recent year, was q30 d, now q23-26 d. Mod flow, never prolonged/heavy. No skipped periods.     2020 Cycles q23-26. No spotting or bleeding between periods. No history of anemia - last CBC 2019 normal.       - reportedly had a 7 cm fundal fibroid (outside imaging)      2023 Cycles 23-25 days. Started getting heavy in the past 4-6 months. Changes tampon & back up pad every 2 hours on heaviest day. Gets up in the middle of the night to change at 3-6 hr. +Dysmenorrhea 7/10. On the first day only. Takes nothing for it. Occasional motrin.+intermenstrual cramping. Occasional hot flashes or night sweats. Lots of mood issues. Says labs were ok, fasting glucose though      Pelvic US 23 with 2.5 mm endometrium, fibroid 7.1 cm intramural posterior fundus, some of which appeared degenerate & did appear to distort the endometrial cavity somewhat. Ovaries normal. There was also a small isthmocele noted on my read of images as well.      23 EMB - suggestive of probable necrotic endometrial polyp.     23 Hysteroscopic polypectomy, D&C. Findings: retroverted uterus, sounded 7-8 cm, endometrial cavity arcuate vs slightly bicornuate in shape, possible endometrial polyp, isthmocele. Path benign endometrial polyp.     Sexually active? Yes, with partner since      Dyspareunia? +always pain with sex.      Contraception: condoms  STDs: no  HPV vaccine done    WWE 23     Cervical cancer screenin, ASCUS with negative HPV. No treatment  2021 - Pap & HPV  negative - [facility] - Milagros Lew MD    23 pap & HPV neg      Breast cancer screening:  Mammogram: 22 - No significant change.  Stable benign asymmetry in left breast. Heterogeneously dense  23 Heterogeneously dense, stable     Colon cancer screenin2023 EGD & colonoscopy - int hemorrhoids.      Osteoporosis screening:  DEXA scan N    OB History    Para Term  AB Living   3 2 2   1 2   SAB IAB Ectopic Multiple Live Births   1       2      # Outcome Date GA Lbr Keith/2nd Weight Sex Type Anes PTL Lv   3 SAB 20 7w6d    SAB   FD      Birth Comments: US guided MVA for missed AB. MFM provided the US guidance - note indicates worrisome for CS scar implantation   2 Term 16 38w3d  6 lb 8 oz (2.948 kg) M   Y LATOYA   1 Term 14 37w0d  4 lb (1.814 kg) M   N ALTOYA     Past Medical History:   Diagnosis Date    Abdominal bloating 2023    Abdominal distention     diagnosed IBS    Abdominal pain     diagnosed IBS    Acquired hypothyroidism 04/10/2023    Adrenal cyst (HCC) 2010    Increased from 2.1 cm to 4.2 cm. Biochemical work up for cushings' syndrome, pheochromocytoma and aldosterone secreting adrenal adenoma where all normal. MR Abdomen (3/11/21): Since 2010, mild increase in size of a benign 4.2-cm left adrenal cyst. Gen Surg   A repeat survallence abdominal MRI should be done in about 4-5 years. She does not need repeat adrenal hormones at that time.    ASCUS of cervix with negative high risk HPV     Blood in the stool unsure    likely related to hemorrhoids    Dense breasts 2022    Diarrhea, unspecified 2023    Disorder of thyroid     Elevated fasting glucose 2023    Esophageal reflux     Fatigue approx 2017    on and off    Fibroids     As of 2020 7 cm fundal fibroid    Headache disorder     migraines    Heartburn 2010    mild, occasional    Heavy menses     occasionall, related fibroid     Hemorrhoids 2018    Hypothyroidism     IBS (irritable bowel syndrome)     Indigestion     Iron deficiency anemia     Migraine without aura     nausea, light sensitivity. No vision changes.    Migraines     Pain with bowel movements unsure    Pap smear for cervical cancer screening 2023    Pap & HPV negative    Serous cystadenoma of left ovary 2017    cyst removed laparoscopically    Status post hysteroscopic polypectomy 2023    benign endometrial polyp    Stress 2017    Visual impairment     Wears glasses 2000    contacts and glasses        Past Surgical History:   Procedure Laterality Date                Colonoscopy  2017    Was having a lot of issues. May have had polyps. Doesn't remember if was told when to come back    Colonoscopy  2010    IBS symptoms. Thinks there were polyps    Colonoscopy  2023    No polyps. Biopsy negative. Done for abdominal pain, bloating, IBS-D, hematochezia    Egd  2010    Egd  2023    gastritis. biopsies negative. Done for: abdominal pain, bloating, IBS-D, hematochezia    Hc endometrial sampling w or wo endocerv sampling  2023    EMB. Cervix very anterior. Sounded 9 cm. Path: Polypoid fragments of probable necrotic endometrial polyp.Dr. Caro Bryan    Hysteroscopy,with sampling  2023    Hysteroscopy, D&C, sampling of isthmocele cavity tissue. Path: Benign endometrial polyp.Dr. Caro Bryan, [facility] localization wire 1 site left (cpt=19281)       benign    [facility] screening bilat (sby=08237)  2022    No significant change.  Stable benign asymmetry in left breast. Heterogeneously dense    Reduction left          Reduction of large breast Bilateral 2004    Reduction right      2005    Removal of ovarian cyst(s) Left 2017    LAPAROSCOPIC OVARIAN CYST REMOVAL; LEFT (serous cystadenofibroma) As-Elodia Nash MD; [place]    Surg rx missed abortn,1st tri  2020    Manual  vacuum aspiration under ultrasound guidance for 7w6d missed AB (known large fundal fibroid 7 cm & lack of clear gestational sac). Dariela Hartley MD. [facility] provided the US guidance - note indicates worrisome for CS scar implantation       Allergies:  No Known Allergies    Medications:  Current Outpatient Medications   Medication Sig Dispense Refill    levothyroxine 88 MCG Oral Tab Take 1 tablet (88 mcg total) by mouth before breakfast. 90 tablet 3    Magnesium 400 MG Oral Cap Take 1 Capful by mouth daily.      CHOLECALCIFEROL 25 MCG (1000 UT) Oral Tab Take 1 tablet (1,000 Units total) by mouth daily.          Social History     Socioeconomic History    Marital status:     Number of children: 2   Occupational History     Employer: [employer]   Tobacco Use    Smoking status: Never     Passive exposure: Never    Smokeless tobacco: Never   Vaping Use    Vaping status: Never Used   Substance and Sexual Activity    Alcohol use: Not Currently     Alcohol/week: 0.0 - 2.0 standard drinks of alcohol     Comment: socially    Drug use: Never    Sexual activity: Yes     Partners: Male     Birth control/protection: Condom   Other Topics Concern    Caffeine Concern No    Exercise Yes    Seat Belt Yes    Special Diet No    Stress Concern No    Weight Concern No    Occupational Exposure No    Hobby Hazards No    Sleep Concern No    Back Care Yes    Bike Helmet Yes    Self-Exams Yes       Family History   Problem Relation Age of Onset    High Cholesterol Father     Heart Disease Father         unknown    Heart Attack Father         unknown    Diabetes Father         unknown    Hypertension Father         unknown    COPD Father         smoker    Osteoporosis Mother         middle age    Other (autism) Son     ADHD Son     Thyroid disease Maternal Grandmother     Kidney Cancer Paternal Grandmother     Stroke Paternal Grandfather     Heart Disease Paternal Grandfather     Crohn's Disease Paternal Aunt      Breast Cancer Neg     Ovarian Cancer Neg     Colon Cancer Neg     Uterine Cancer Neg          Objective:     Vitals:    05/13/24 1001   BP: 110/70   Pulse: 67   Weight: 183 lb 12.8 oz (83.4 kg)   Height: 67.6\"         Body mass index is 28.28 kg/m².    Physical Exam  Vitals and nursing note reviewed.   Constitutional:       Appearance: Normal appearance.   HENT:      Head: Normocephalic and atraumatic.   Eyes:      Extraocular Movements: Extraocular movements intact.      Conjunctiva/sclera: Conjunctivae normal.   Neck:      Comments: No thyromegaly  Cardiovascular:      Rate and Rhythm: Normal rate and regular rhythm.      Heart sounds: No murmur heard.  Pulmonary:      Effort: Pulmonary effort is normal.      Breath sounds: Normal breath sounds.      Comments: Right breast: right UOQ feels slightly more prominent & firm. No axillary LAD    Left breast: no masses, no axillary LAD  Abdominal:      General: There is no distension.      Palpations: Abdomen is soft. There is no mass.      Tenderness: There is no abdominal tenderness. There is no guarding or rebound.      Hernia: No hernia is present.   Genitourinary:     Comments: VULVA: normal appearing vulva with no masses, tenderness or lesions  URETHRA: unremarkable   PERINEUM: intact  VAGINA: normal appearing vagina with normal color and discharge, no lesions   CERVIX: normal appearing cervix without discharge or lesions  UTERUS: enlarged to about 8-10 wk size  ADNEXA: normal adnexa in size, nontender and no masses  PELVIC FLOOR: mild hypertonicity, mild tenderness       Neurological:      General: No focal deficit present.      Mental Status: She is alert.      Cranial Nerves: No cranial nerve deficit.   Psychiatric:         Mood and Affect: Mood normal.         Behavior: Behavior normal.         Thought Content: Thought content normal.         Judgment: Judgment normal.             Labs:     Component      Latest Ref Rng 4/17/2023 3/19/2024   WBC      4.0 - 11.0  x10(3) uL  7.6    RBC      3.80 - 5.30 x10(6)uL  4.29    Hemoglobin      12.0 - 16.0 g/dL  13.7    Hematocrit      35.0 - 48.0 %  42.5    Platelet Count      150.0 - 450.0 10(3)uL  245.0    MCV      80.0 - 100.0 fL  99.1    MCH      26.0 - 34.0 pg  31.9    MCHC      31.0 - 37.0 g/dL  32.2    RDW      %  11.9    Prelim Neutrophil Abs      1.50 - 7.70 x10 (3) uL  3.76    Neutrophils Absolute      1.50 - 7.70 x10(3) uL  3.76    Lymphocytes Absolute      1.00 - 4.00 x10(3) uL  2.59    Monocytes Absolute      0.10 - 1.00 x10(3) uL  0.72    Eosinophils Absolute      0.00 - 0.70 x10(3) uL  0.45    Basophils Absolute      0.00 - 0.20 x10(3) uL  0.04    Immature Granulocyte Absolute      0.00 - 1.00 x10(3) uL  0.03    Neutrophils %      %  49.6    Lymphocytes %      %  34.1    Monocytes %      %  9.5    Eosinophils %      %  5.9    Basophils %      %  0.5    Immature Granulocyte %      %  0.4    Glucose      70 - 99 mg/dL 101 (H)  110 (H)    Sodium      136 - 145 mmol/L 134 (L)  137    Potassium      3.5 - 5.1 mmol/L 4.3  4.3    Chloride      98 - 112 mmol/L 108  108    Carbon Dioxide, Total      21.0 - 32.0 mmol/L 26.0  26.0    ANION GAP      0 - 18 mmol/L 0  3    BUN      9 - 23 mg/dL 11  12    CREATININE      0.55 - 1.02 mg/dL 0.94  0.99    CALCIUM      8.5 - 10.1 mg/dL 8.9  8.8    CALCULATED OSMOLALITY      275 - 295 mOsm/kg 278  284    EGFR      >=60 mL/min/1.73m2 77  72    AST (SGOT)      15 - 37 U/L 19  18    ALT (SGPT)      13 - 56 U/L 26  28    ALKALINE PHOSPHATASE      37 - 98 U/L 42  46    Total Bilirubin      0.1 - 2.0 mg/dL 0.6  0.3    PROTEIN, TOTAL      6.4 - 8.2 g/dL 7.0  7.0    Albumin      3.4 - 5.0 g/dL 3.7  3.9    Globulin      2.8 - 4.4 g/dL 3.3  3.1    A/G Ratio      1.0 - 2.0  1.1  1.3    Patient Fasting for CMP? Yes  Yes    Cholesterol, Total      <200 mg/dL  187    HDL Cholesterol      40 - 59 mg/dL  81 (H)    Triglycerides      30 - 149 mg/dL  83    LDL Cholesterol Calc      <100 mg/dL  91    VLDL       0 - 30 mg/dL  13    NON-HDL CHOLESTEROL      <130 mg/dL  106    Patient Fasting for Lipid?  Yes    HEMOGLOBIN A1c      <5.7 % 5.2  5.5    ESTIMATED AVERAGE GLUCOSE      68 - 126 mg/dL 103  111    TSH      0.358 - 3.740 mIU/mL  0.541         Pathology:    Assessment:     Lana Shcwab is a 43 year old  female here for stable 7.1 cm dominant fibroid with degeneration changes, menorrhagia, dysmenorrhea, Longstanding dyspareunia, IBS, Intermenstrual cramping, h/o endometrial polyp, mood changes/irritability, elevated fasting glucose (2023 Glu 101 -> 3/2024 Glu 110) here for WWE. Notes some abdominal fullness & discomfort. Difficulty losing weight    Diagnoses and all orders for this visit:    Encounter for well woman exam with abnormal findings    Screening for cervical cancer  -     ThinPrep PAP Smear; Future  -     Hpv Dna  High Risk , Thin Prep Collect; Future  -     Image-Guided Pap Smear ([facility])    Abdominal fullness  -     US PELVIS W EV (CPT=76856/89413); Future    Dyspareunia, female    Fibroid  -     US PELVIS W EV (CPT=76856/23038); Future    Dysmenorrhea    Menorrhagia with regular cycle    Mood disturbance    Overweight (BMI 25.0-29.9)  -     OP REFERRAL TO WEIGHT LOSS CLINIC    Elevated fasting glucose  -     Hemoglobin A1C; Future  -     Comp Metabolic Panel (14); Future    Irregular bowel habits    Unable to lose weight  -     OP REFERRAL TO WEIGHT LOSS CLINIC    Mass of upper outer quadrant of right breast  -     [facility] KEYLA 2D+3D DIAGNOSTIC MURIEL  BILAT (CPT=77066/68658); Future      Plan:     Menorrhagia, dysmenorrhea, dominant fibroid, occasional intermenstrual pelvic pain    -suspect perimenopause & possibly rupturing some cysts vs fibroid pain (degeneration) vs bowel related pain  -2023 EMB - endometrial polyp   -treatment options will depend on pathology but include hormonal contraceptive, GnRH antagonist with add back,UAE/UFE/RFA, myomectomy, hysterectomy,  expectant management   -As of 7/2023 not too bothered about the bleeding so much anymore, the intermittent central cramping is a little more frequent, but less intense than it had been   -9/21/23 Hysteroscopic polypectomy, D&C. Retroverted uterus, sounded 7-8 cm, endometrial cavity arcuate vs slightly bicornuate in shape, possible endometrial polyp, isthmocele. Path benign endometrial polyp.   -discussed could consider hormonal therapies (would probably avoid LNG-IUD though given cavity shape. Patient would like to observe for now.  -5/13/24 overall symptoms generally less, but will check on US. Leaning towards non intervention.      Dyspareunia, irregular bowel habits, +blood in stool & dyschezia  -9/2023 EGD gastritis, colonoscopy int hemorrhoids.   -pelvic floor PT - some visits attended in 2024 - improvement in vaginal pain, though bowels a little worse. Busy in personal life so sessions discontinued after 3/11/24   -lower libido noted - stress & sleep issues.      Mood issues  -suspect situational & some perimenopausal influence, may want to consider hormonal contraception but can worsen mood in some cases   -Screening labs per PCP 3/2024. Elevated fasting glucose persists/worsening - will recheck glucose. Cortisol & insulin     Pap & HPV negative 5/8/23  -up to date per guidelines. Patient prefers pap is done today.    Dense breasts   -5/13/24 Breast exam - dense in bilateral UOQ  -11/13/23 Screening mammogram - Heterogeneously dense, stable   -5/13/24 Breast exam - right UOQ feels slightly more prominent & firm - diagnostic mammogram ordered     HPV vaccine done  Contraception condoms  Colonoscopy 9/2023 - no polyps. Up to date.     BMI  overweight, elevated fasting glucose - healthy diet & exercise encouraged. Weight lifting encouraged   -weight loss clinic ordered     RTC - WWE due after 5/13/25    Caro Bryan MD  [facility] - [facility]JO[facility]

## 2024-05-13 ENCOUNTER — OFFICE VISIT (OUTPATIENT)
Facility: CLINIC | Age: 44
End: 2024-05-13
Payer: COMMERCIAL

## 2024-05-13 ENCOUNTER — OFFICE VISIT (OUTPATIENT)
Dept: INTERNAL MEDICINE CLINIC | Facility: CLINIC | Age: 44
End: 2024-05-13
Payer: COMMERCIAL

## 2024-05-13 VITALS
DIASTOLIC BLOOD PRESSURE: 70 MMHG | HEART RATE: 67 BPM | HEIGHT: 67.6 IN | WEIGHT: 183.81 LBS | SYSTOLIC BLOOD PRESSURE: 110 MMHG | BODY MASS INDEX: 28.18 KG/M2

## 2024-05-13 VITALS
WEIGHT: 183.38 LBS | RESPIRATION RATE: 16 BRPM | SYSTOLIC BLOOD PRESSURE: 112 MMHG | BODY MASS INDEX: 28.12 KG/M2 | DIASTOLIC BLOOD PRESSURE: 68 MMHG | HEART RATE: 61 BPM | HEIGHT: 67.6 IN | TEMPERATURE: 98 F | OXYGEN SATURATION: 98 %

## 2024-05-13 DIAGNOSIS — R19.8 IRREGULAR BOWEL HABITS: ICD-10-CM

## 2024-05-13 DIAGNOSIS — N94.10 DYSPAREUNIA, FEMALE: ICD-10-CM

## 2024-05-13 DIAGNOSIS — R19.8 ABDOMINAL FULLNESS: ICD-10-CM

## 2024-05-13 DIAGNOSIS — Z01.411 ENCOUNTER FOR WELL WOMAN EXAM WITH ABNORMAL FINDINGS: Primary | ICD-10-CM

## 2024-05-13 DIAGNOSIS — E03.9 ACQUIRED HYPOTHYROIDISM: ICD-10-CM

## 2024-05-13 DIAGNOSIS — D21.9 FIBROID: ICD-10-CM

## 2024-05-13 DIAGNOSIS — N63.11 MASS OF UPPER OUTER QUADRANT OF RIGHT BREAST: ICD-10-CM

## 2024-05-13 DIAGNOSIS — Z00.00 ENCOUNTER FOR ANNUAL PHYSICAL EXAM: Primary | ICD-10-CM

## 2024-05-13 DIAGNOSIS — N94.6 DYSMENORRHEA: ICD-10-CM

## 2024-05-13 DIAGNOSIS — E66.3 OVERWEIGHT (BMI 25.0-29.9): ICD-10-CM

## 2024-05-13 DIAGNOSIS — R63.8 UNABLE TO LOSE WEIGHT: ICD-10-CM

## 2024-05-13 DIAGNOSIS — R73.01 ELEVATED FASTING GLUCOSE: ICD-10-CM

## 2024-05-13 DIAGNOSIS — Z12.4 SCREENING FOR CERVICAL CANCER: ICD-10-CM

## 2024-05-13 DIAGNOSIS — R45.86 MOOD DISTURBANCE: ICD-10-CM

## 2024-05-13 DIAGNOSIS — N92.0 MENORRHAGIA WITH REGULAR CYCLE: ICD-10-CM

## 2024-05-13 PROBLEM — R19.7 DIARRHEA, UNSPECIFIED: Status: RESOLVED | Noted: 2023-09-05 | Resolved: 2024-05-13

## 2024-05-13 PROBLEM — R14.1 ABDOMINAL GAS PAIN: Status: RESOLVED | Noted: 2023-09-05 | Resolved: 2024-05-13

## 2024-05-13 PROCEDURE — 87624 HPV HI-RISK TYP POOLED RSLT: CPT | Performed by: OBSTETRICS & GYNECOLOGY

## 2024-05-13 PROCEDURE — 3074F SYST BP LT 130 MM HG: CPT | Performed by: OBSTETRICS & GYNECOLOGY

## 2024-05-13 PROCEDURE — 3008F BODY MASS INDEX DOCD: CPT | Performed by: OBSTETRICS & GYNECOLOGY

## 2024-05-13 PROCEDURE — 99214 OFFICE O/P EST MOD 30 MIN: CPT | Performed by: OBSTETRICS & GYNECOLOGY

## 2024-05-13 PROCEDURE — 3078F DIAST BP <80 MM HG: CPT | Performed by: OBSTETRICS & GYNECOLOGY

## 2024-05-13 PROCEDURE — 3008F BODY MASS INDEX DOCD: CPT | Performed by: NURSE PRACTITIONER

## 2024-05-13 PROCEDURE — 3074F SYST BP LT 130 MM HG: CPT | Performed by: NURSE PRACTITIONER

## 2024-05-13 PROCEDURE — 99396 PREV VISIT EST AGE 40-64: CPT | Performed by: OBSTETRICS & GYNECOLOGY

## 2024-05-13 PROCEDURE — 99459 PELVIC EXAMINATION: CPT | Performed by: OBSTETRICS & GYNECOLOGY

## 2024-05-13 PROCEDURE — 3078F DIAST BP <80 MM HG: CPT | Performed by: NURSE PRACTITIONER

## 2024-05-13 PROCEDURE — 99396 PREV VISIT EST AGE 40-64: CPT | Performed by: NURSE PRACTITIONER

## 2024-05-13 RX ORDER — LEVOTHYROXINE SODIUM 88 UG/1
88 TABLET ORAL
Qty: 90 TABLET | Refills: 3 | Status: SHIPPED | OUTPATIENT
Start: 2024-05-13

## 2024-05-13 NOTE — PATIENT INSTRUCTIONS
Low carb  Stress management '  Magnesium for sleep  Melatonin for sleep - 3 mg   L-theanine - in the morning   Ashwaganda & Christen - adaptogens  Berberine (or dihydroberberine)     [facility]    [place]  [address], [address]  Ph [phone]    [place]  [address], [address]  Ph [phone]    [place]  [address], [address]  Ph [phone]    [place]  [address], [facility] [address], [address]  Ph [phone]    [place]  [address]  Ph [phone]     Lubricants  Aloe Cadabra  Good Clean Love  Pre-Seed (targeted for those attempting to conceive)   Restore  *Lubricants that are hypo-osmolar or iso-osmolar are preferable to hyper-osmolar formulations.     Vaginal moisturizers  -Luvena, Replens, Rephresh     Natural products from [employer]  Revaree - vaginal dryness (hyaluronic acid)   Serenol - irritability, mood swings from PMS  Relizen - menopausal hot flashes  Ristela - sexual satisfaction       Sexual dysfunction    Sexual dysfunction and dissatisfaction are very common and often multifactorial.     Optimizing physical & mental healthy, reducing stress, and working on the quality of your relationship with your partner are actually the most important.     Unfortunately anxiety & depression, as well as the drugs used to treat those conditions, are often associated with sexual dysfunction.     There are two FDA approved medications indicated for the treatment of premenopausal women with acquired, generalized hypoactive sexual desire disorder (HSDD)  -the persistent or recurrent deficiency or absence of sexual desire or receptivity to sexual activity that causes marked marked distress or interpersonal difficulty and is NOT due to:   -a co-existing medical or psychiatric condition,   -problems within the relationship, or   -the effects of a medication or other drug substance.   -HSDD is believed to be caused by an imbalance of neurotransmitters. Too few excitatory  signals, too many inhibitory signals, or some combination of the two.  *Acquired HSDD = HSDD that develops in a patient who previously had no problems with sexual desire  *Generalized HSDD = HSDD that occurs regardless of the type of stimulation, situation, or partner     These have not been studied in women with mental health conditions or who are taking psychiatric medications.  These medications are also limited in their efficacy.     Addyi (Flibanserin)  -initially had a black box warning from the FDA regarding hypotension (low blood pressure & fainting) which can be made worse with alcohol  -mechanism of action unknown  -high agonist activity at serotonin receptor 5-HT1A & high antagonist activity at serotonin receptor 5-HT2A  -moderate antagonist activities at the 5-HT2B, 5-HT2C, and dopamine D4 receptors  -food increases the extent of absorption of flibanserin   -food slowed the rate of absorption of flibanserin   -taken nightly at bedtime. Do not perform activities requiring full alertness until at least 6 hours after each dose.   -discontinue treatment after 8 weeks if no improvement  -Most common side effects:    Dizziness 11.4%  Somnolence 11.2%  Nausea 10.4%  Fatigue 9.2%  Insomnia 4.9%  Dry mouth 2.4%  Anxiety 1.8%  Constipation 1.6%   Hypotension - 0.2%  Syncope - 0.4%  -In a 2 year carcinogenicity study in mice, there was a statistically significant and dose-related increase of malignant mammary tumors in fetal mice at exposures 3 and 10 times the recommended clinical dose. No such increase were seen in male mice or in male or female rats. The clinical significance of these findings is unknown.   -Contraindications: hepatic impairment, known hypersensitivity to Addyi, Moderate or strong CY inhibitors   -Drug interactions  Alcohol - increases the risk of hypotension (low blood pressure), syncope (fainting), and   CNS depression (somnolence, sedation.) Wait at least 2 hours after consuming 1 or 2  standard   alcoholic drinks before taking Addyi at bedtime or skip the Addyi dose if they ave consumed  3 or more alcoholic drinks that evening    1 standard alcoholic drink = one 12 oz regular beer = 5 oz wine = 1.5 oz distilled spirits or shot  CNS depressants (e.g. diphenhydramine (Benadryl), opioids, hypnotics, benzodiazepines, etc)  Moderate or strong CY inhibitors (e.g. fluconazole, ketoconazole, ciprofloxacin, grapefruit juice)  Weak CY inhibitors (e.g. oral contraceptives, cimetidine, fluoxetine, gingko, ranitidine)  Strong [id number] inhibitors (e.g. proton pump inhibitors, SSRIs, benzodiazepines, antifungals)  CY inducers (e.g. carbamazepine, phenobarbital, [place]'s Wort, etc)  Digoxin or other P-glycoprotein (P-gp) substrates  -Efficacy:   Patient's global impression of improvement (responders \"much improved\" or \"very much improved\") with Addyi compared to placebo:   8-9% SSE   10-13% FSFI desire domain   7-13% FSDS-R question 13  Patient's global impression of improvement (responders at least minimally imporved) with Addyi compared to placebo:   10-15% SSE   12-13% FSFI desire domain   9-12% for FSDS-R question 13  *SSE = satisfying sexual events (I.e. sexual intercourse, oral sex, masturbation, or genital stimulation by partner)  *FSFI Desire = Female Sexual Function Index desire domain consists of 2 questions:   \"Over the past 4 weeks, how often did you feel sexual desire or interest?\"    Response range 1 (almost never or never) to 5 (almost always or always)   \"Over the past 4 weeks, how would you rate your level (degree) of sexual desire or interest?\"    Response range 1 (very low or none at all) to 5 (very high)   *FSDS-R question 13 = Female Sexual Distress Scale-Revised   \"How often did you feel bothered by low sexual desire?\"    Response over 7 day recall period 0 (never) to 4 (always)    Vyleesi (bremelanotide injection)  -self-administered subcutaneous injection  -melanocortin  receptor (MCR) agonist that non selectively activates several receptor subtypes. The mechanism by which Vyleesi improves HSDD in women is unknown   -taken at least 45 min before you plan to have sex  -lasts about 8-10 hours   -limited to 1 use in 24 hours  -limited to 8 uses per month  -May reduce the rate & extent of absorption of concomitantly administered oral medications, likely due to slowing gastric motility  -Avoid taking Vyleesi when taking oral drugs that are dependent on threshold concentrations for efficacy (e.g. antibiotics)  -discontinuation rate due to adverse reactions during the study 18%  -discontinue treatment after 8 weeks if no improvement  -Contraindications: uncontrolled hypertension, known cardiovascular disease  -Side effects:   Temporary increase in blood pressure & decrease in heart rate   Darkening of the skin on certain parts of the body (focal hyperpigmentation) 1%  -including the face, gums, and breasts  -may be permanent   Nausea 40%  Flushing 20%  Injection site reactions 13%  Headache 11%  Vomiting 4.8%  -Efficacy  No statistically significant decreases between treatment groups and placebo in the change of number of SSEs    The FDA no longer requires the number for SSEs to be a co-primary endpoint in   clinical trials because it is not part of the diagnosis of HSDD  Change in FSFI-D score above baseline at 24 wk for   Vyleesi 0.58   Placebo 0.22   (Score range 1.2 (lowest desire) to 6 (highest desire))  Change in score for reduction in distress (FSDS-DENNISE) at 24 wk for    Vylessi -0.7   Placebo -0.4   (Score range 0 (never bothered) to 4 (always feel bothered)       There is also a non-FDA approved (herbal) option:  Ristela  -2 pills per day orally  -over the counter - through company called \"[employer].\" Can order via phone or website.     A great option is to see a sex therapist. You can search for a provider near you through the following websites:    American Association of  Sexuality Educators, Counselors, and Therapists  https://www.aasect.org/    Society for Sex Therapy and Research  https://sstarnet.org/       Perimenopause/Menopause Symptoms   Per [facility]   Patient Instructions   [employer] Relizen for relief of hot flashes and night sweats - please give it about 3mos for maximum benefit.  -OR-             Estrovera:       Estrovera by [employer] - Estrovera’s key ingredient, [id number], has been used safely and effectively for over 20 years and is supported by clinical studies. Estrovera provides relief for multiple menopausal symptoms, including hot flashes, sleep disturbances, mood swings, irritability, anxiety, and sexual problems.     Dose:  Take one tablet with food and a glass of water once daily at the same time of day       2. Magnesium Citrate or CALM (can be found at [employer], Fruitful Yield, [employer], etc) - start with 1/4 teaspoon in warm water each night and then increase by 1/4 teaspoon each night until you find your therapeutic dose (when the bowels are moving correctly).         3. Metabolic Detox Complete Natural Chocolate by Metabolic Maintenance    Metabolic Detox Complete, is an excellent supplement for comprehensive detoxification programs and elimination diets. It contains a hypoallergenic blend of pea, rice, and hemp, non-GMO proteins, beneficial medium chain triglycerides, and omega-3 fatty acids. Metabolic Detox® Complete also includes a balanced combination of vitamins, minerals, essential amino acids, and other beneficial nutrient cofactors to simplify and support both Phase I and Phase II detoxification. With 20 grams of protein per serving, it makes an excellent meal replacement or a daily protein shake.*     Provides 20 grams of clean protein for sustained energy*  Nourishes Phase I and II detoxification pathways*  Enhanced with liver protective silymarin and green tea plant extracts        Suggested Use:  Blend, shake or stir 2 scoops  (50 grams) into 8-12 ounces of water - or preferably almond milk, 1-2 times per day.      4. Warm lemon water every morning     5. Jose J-3 by [employer] for libido support:         - Jose J, cultivated in the [place], has long been considered the ginseng of the [place], supporting energy, stamina and vitality. For centuries, it has been associated with supporting healthy libido and sexual performance. Studies indicate that this effect appears independent of any influence on testosterone or other hormones, further supporting the safety of jose j.1,2 In addition to traditional use, several recent trials involving male subjects indicate its potential for supporting healthy sexual desire and performance.3 Jose J has also been associated with promoting healthy female sexual interest and healthy reproductive system function.     Dose: As a dietary supplement, take 1 capsule, 1-2 times daily, with or between meals      Where to Purchase: [email or web address]  This is our [facility] online dispensary for vitamins and supplements where you receive a 10% discount off of supplement prices! Select the \"Log In\" and then use your email address to complete creating your personal account. Please call [employer] at [phone], if you need direct help.      The products and items listed below (the “Products”)  and their claims have not been evaluated by the Food and Drug Administration. Dietary products are not intended to treat, prevent, mitigate or cure disease. Ultimately, you must draw your own conclusion as to the efficacy of the Product and immediately stop use of the Products if a negative reaction should arise.  You should always consult a licensed health care professional before starting any supplement, dietary, or exercise program, especially if you are pregnant or have any pre-existing injuries or medical conditions. The patient agrees that the [facility] and its  [facility] and its [facility] (“[facility]”) are not liable for the patients use of the Products. [facility] makes no representations or warranties of any kind, expressed or implied, as to the Products, including, but not limited to its efficacy, benefits or outcomes.  Patient agrees to contact his/her healthcare professional and stop use of Products should any reactions arise      2/2023

## 2024-05-14 LAB — HPV I/H RISK 1 DNA SPEC QL NAA+PROBE: NEGATIVE

## 2024-05-16 LAB
.: NORMAL
.: NORMAL

## 2024-05-22 ENCOUNTER — HOSPITAL ENCOUNTER (OUTPATIENT)
Dept: MAMMOGRAPHY | Facility: HOSPITAL | Age: 44
Discharge: HOME | End: 2024-05-22
Attending: OBSTETRICS & GYNECOLOGY

## 2024-05-22 DIAGNOSIS — N63.11 MASS OF UPPER OUTER QUADRANT OF RIGHT BREAST: ICD-10-CM

## 2024-05-22 PROCEDURE — 77066 DX MAMMO INCL CAD BI: CPT | Performed by: OBSTETRICS & GYNECOLOGY

## 2024-05-22 PROCEDURE — 76642 ULTRASOUND BREAST LIMITED: CPT | Performed by: OBSTETRICS & GYNECOLOGY

## 2024-05-22 PROCEDURE — 77062 BREAST TOMOSYNTHESIS BI: CPT | Performed by: OBSTETRICS & GYNECOLOGY

## 2024-06-12 PROBLEM — R19.8 IRREGULAR BOWEL HABITS: Status: ACTIVE | Noted: 2024-06-12

## 2024-06-12 PROBLEM — R63.8 UNABLE TO LOSE WEIGHT: Status: ACTIVE | Noted: 2024-06-12

## 2024-06-12 PROBLEM — N63.11 MASS OF UPPER OUTER QUADRANT OF RIGHT BREAST: Status: ACTIVE | Noted: 2024-06-12

## 2025-05-12 ENCOUNTER — HOSPITAL ENCOUNTER (OUTPATIENT)
Dept: MAMMOGRAPHY | Facility: HOSPITAL | Age: 45
Discharge: HOME | End: 2025-05-12
Attending: OBSTETRICS & GYNECOLOGY
Payer: COMMERCIAL

## 2025-05-12 DIAGNOSIS — Z12.31 ENCOUNTER FOR SCREENING MAMMOGRAM FOR MALIGNANT NEOPLASM OF BREAST: ICD-10-CM

## 2025-05-12 PROCEDURE — 77067 SCR MAMMO BI INCL CAD: CPT | Performed by: OBSTETRICS & GYNECOLOGY

## 2025-05-12 PROCEDURE — 77063 BREAST TOMOSYNTHESIS BI: CPT | Performed by: OBSTETRICS & GYNECOLOGY

## 2025-07-03 DIAGNOSIS — E03.9 ACQUIRED HYPOTHYROIDISM: ICD-10-CM

## 2025-07-03 RX ORDER — LEVOTHYROXINE SODIUM 88 UG/1
88 TABLET ORAL
Qty: 90 TABLET | Refills: 1 | OUTPATIENT
Start: 2025-07-03

## 2025-07-28 DIAGNOSIS — E03.9 ACQUIRED HYPOTHYROIDISM: ICD-10-CM

## 2025-07-30 RX ORDER — LEVOTHYROXINE SODIUM 88 UG/1
88 TABLET ORAL
Qty: 30 TABLET | Refills: 0 | Status: SHIPPED | OUTPATIENT
Start: 2025-07-30

## 2025-08-27 ENCOUNTER — TELEPHONE (OUTPATIENT)
Dept: INTERNAL MEDICINE CLINIC | Facility: CLINIC | Age: 45
End: 2025-08-27

## (undated) DEVICE — NEEDLE SPINAL 22X3-1/2 BLK

## (undated) DEVICE — SOLUTION  .9 3000ML

## (undated) DEVICE — INFLOWHYSTER S&N

## (undated) DEVICE — DEV REMOVAL TRUCLEAR SFT MINI

## (undated) DEVICE — SPECIMEN SOCK - STANDARD: Brand: MEDI-VAC

## (undated) DEVICE — SOL NACL IRRIG 0.9% 1000ML BTL

## (undated) DEVICE — DRAPE SURG 18X24

## (undated) DEVICE — SLEEVE KENDALL SCD EXPRESS MED

## (undated) DEVICE — SEAL TRUCLEAR  HYSTERSCOP

## (undated) DEVICE — MEDI-VAC NON-CONDUCTIVE SUCTION TUBING: Brand: CARDINAL HEALTH

## (undated) DEVICE — STERILE POLYISOPRENE POWDER-FREE SURGICAL GLOVES: Brand: PROTEXIS

## (undated) DEVICE — OUTFLOW HYSTER S&N

## (undated) DEVICE — GYN CDS: Brand: MEDLINE INDUSTRIES, INC.

## (undated) DEVICE — 2000CC GUARDIAN II: Brand: GUARDIAN

## (undated) NOTE — LETTER
[facility]  After Care Instructions for Endometrial Biopsy      Biopsy Results   You will receive a phone call with your biopsy results in 7 business days. If you have not received your results in 7 days, please contact our office. The results of your biopsy will determine if further treatment will be necessary. Bleeding   You may have some light bleeding or blackish clumpy discharge for several days after your biopsy. Restrictions    You should avoid intercourse or tampon use for 1 day after your biopsy. Pain    You may experience mild menstrual cramping after your biopsy. You may use Ibuprofen, Aleve or Tylenol to relieve your discomfort. If you experience severe or persistent pain contact our office. If you have any additional questions, please call us at [phone].